# Patient Record
Sex: FEMALE | Race: WHITE | Employment: UNEMPLOYED | URBAN - METROPOLITAN AREA
[De-identification: names, ages, dates, MRNs, and addresses within clinical notes are randomized per-mention and may not be internally consistent; named-entity substitution may affect disease eponyms.]

---

## 2018-07-08 ENCOUNTER — HOSPITAL ENCOUNTER (EMERGENCY)
Facility: HOSPITAL | Age: 28
Discharge: HOME | End: 2018-07-08
Attending: EMERGENCY MEDICINE
Payer: MEDICAID

## 2018-07-08 ENCOUNTER — HOSPITAL ENCOUNTER (OUTPATIENT)
Age: 28
Discharge: ANOTHER HOSPITAL | End: 2018-07-08
Payer: MEDICAID

## 2018-07-08 ENCOUNTER — APPOINTMENT (OUTPATIENT)
Dept: CT IMAGING | Facility: HOSPITAL | Age: 28
End: 2018-07-08
Attending: EMERGENCY MEDICINE
Payer: MEDICAID

## 2018-07-08 ENCOUNTER — APPOINTMENT (OUTPATIENT)
Dept: GENERAL RADIOLOGY | Age: 28
End: 2018-07-08
Attending: PHYSICIAN ASSISTANT
Payer: MEDICAID

## 2018-07-08 VITALS
TEMPERATURE: 98 F | WEIGHT: 239 LBS | SYSTOLIC BLOOD PRESSURE: 111 MMHG | BODY MASS INDEX: 40.8 KG/M2 | DIASTOLIC BLOOD PRESSURE: 64 MMHG | HEIGHT: 64 IN | HEART RATE: 82 BPM | OXYGEN SATURATION: 99 % | RESPIRATION RATE: 19 BRPM

## 2018-07-08 VITALS
OXYGEN SATURATION: 97 % | HEART RATE: 78 BPM | WEIGHT: 239 LBS | BODY MASS INDEX: 40.8 KG/M2 | HEIGHT: 64 IN | RESPIRATION RATE: 16 BRPM | DIASTOLIC BLOOD PRESSURE: 74 MMHG | TEMPERATURE: 99 F | SYSTOLIC BLOOD PRESSURE: 116 MMHG

## 2018-07-08 DIAGNOSIS — R79.89 D-DIMER, ELEVATED: ICD-10-CM

## 2018-07-08 DIAGNOSIS — R42 DIZZINESS: ICD-10-CM

## 2018-07-08 DIAGNOSIS — J40 WHEEZY BRONCHITIS: Primary | ICD-10-CM

## 2018-07-08 DIAGNOSIS — J30.9 ALLERGIC RHINITIS, UNSPECIFIED SEASONALITY, UNSPECIFIED TRIGGER: ICD-10-CM

## 2018-07-08 DIAGNOSIS — J20.9 ACUTE BRONCHITIS, UNSPECIFIED ORGANISM: Primary | ICD-10-CM

## 2018-07-08 LAB
#LYMPHOCYTE IC: 2.4 X10ˆ3/UL (ref 0.9–3.2)
#MXD IC: 0.4 X10ˆ3/UL (ref 0.1–1)
#NEUTROPHIL IC: 3.2 X10ˆ3/UL (ref 1.3–6.7)
ATRIAL RATE: 65 BPM
CREAT SERPL-MCNC: 0.7 MG/DL (ref 0.55–1.02)
DDIMER WHOLE BLOOD: 1520 NG/ML DDU (ref ?–400)
GLUCOSE BLD-MCNC: 107 MG/DL (ref 70–99)
HCT IC: 36.7 % (ref 37–54)
HGB IC: 12.6 G/DL (ref 12–16)
ISTAT BLOOD GAS TCO2: 24 MMOL/L (ref 22–32)
ISTAT BUN: 13 MG/DL (ref 8–20)
ISTAT CHLORIDE: 105 MMOL/L (ref 101–111)
ISTAT HEMATOCRIT: 36 % (ref 34–50)
ISTAT IONIZED CALCIUM: 1.22 MMOL/L (ref 1.12–1.32)
ISTAT POTASSIUM: 3.6 MMOL/L (ref 3.6–5.1)
ISTAT SODIUM: 142 MMOL/L (ref 136–144)
ISTAT TROPONIN: <0.1 NG/ML (ref ?–0.1)
LYMPHOCYTES NFR BLD AUTO: 40.2 %
MCH IC: 27.8 PG (ref 27–33.2)
MCHC IC: 34.3 G/DL (ref 31–37)
MCV IC: 80.8 FL (ref 81–100)
MIXED CELL %: 7.3 %
NEUTROPHILS NFR BLD AUTO: 52.5 %
P AXIS: 61 DEGREES
P-R INTERVAL: 146 MS
PLT IC: 272 X10ˆ3/UL (ref 150–450)
POCT BILIRUBIN URINE: NEGATIVE
POCT BLOOD URINE: NEGATIVE
POCT GLUCOSE URINE: NEGATIVE MG/DL
POCT KETONE URINE: NEGATIVE MG/DL
POCT NITRITE URINE: NEGATIVE
POCT PH URINE: 5.5 (ref 5–8)
POCT PROTEIN URINE: NEGATIVE MG/DL
POCT SPECIFIC GRAVITY URINE: 1.03
POCT URINE CLARITY: CLEAR
POCT URINE COLOR: YELLOW
POCT URINE PREGNANCY: NEGATIVE
POCT UROBILINOGEN URINE: 0.2 MG/DL
Q-T INTERVAL: 420 MS
QRS DURATION: 84 MS
QTC CALCULATION (BEZET): 436 MS
R AXIS: 64 DEGREES
RBC IC: 4.54 X10ˆ6/UL (ref 3.8–5.1)
T AXIS: 43 DEGREES
VENTRICULAR RATE: 65 BPM
WBC IC: 6 X10ˆ3/UL (ref 4–13)

## 2018-07-08 PROCEDURE — 99205 OFFICE O/P NEW HI 60 MIN: CPT

## 2018-07-08 PROCEDURE — 87086 URINE CULTURE/COLONY COUNT: CPT | Performed by: PHYSICIAN ASSISTANT

## 2018-07-08 PROCEDURE — 93010 ELECTROCARDIOGRAM REPORT: CPT

## 2018-07-08 PROCEDURE — 71046 X-RAY EXAM CHEST 2 VIEWS: CPT | Performed by: PHYSICIAN ASSISTANT

## 2018-07-08 PROCEDURE — 84484 ASSAY OF TROPONIN QUANT: CPT

## 2018-07-08 PROCEDURE — 81002 URINALYSIS NONAUTO W/O SCOPE: CPT | Performed by: PHYSICIAN ASSISTANT

## 2018-07-08 PROCEDURE — 99284 EMERGENCY DEPT VISIT MOD MDM: CPT

## 2018-07-08 PROCEDURE — 36415 COLL VENOUS BLD VENIPUNCTURE: CPT

## 2018-07-08 PROCEDURE — 81025 URINE PREGNANCY TEST: CPT | Performed by: PHYSICIAN ASSISTANT

## 2018-07-08 PROCEDURE — 94640 AIRWAY INHALATION TREATMENT: CPT

## 2018-07-08 PROCEDURE — 80047 BASIC METABLC PNL IONIZED CA: CPT

## 2018-07-08 PROCEDURE — 85378 FIBRIN DEGRADE SEMIQUANT: CPT | Performed by: PHYSICIAN ASSISTANT

## 2018-07-08 PROCEDURE — 71275 CT ANGIOGRAPHY CHEST: CPT | Performed by: EMERGENCY MEDICINE

## 2018-07-08 PROCEDURE — 93005 ELECTROCARDIOGRAM TRACING: CPT

## 2018-07-08 PROCEDURE — 85025 COMPLETE CBC W/AUTO DIFF WBC: CPT | Performed by: PHYSICIAN ASSISTANT

## 2018-07-08 RX ORDER — ALBUTEROL SULFATE 90 UG/1
2 AEROSOL, METERED RESPIRATORY (INHALATION) EVERY 4 HOURS PRN
Qty: 1 INHALER | Refills: 0 | Status: SHIPPED | OUTPATIENT
Start: 2018-07-08 | End: 2018-08-07

## 2018-07-08 RX ORDER — PREDNISONE 20 MG/1
40 TABLET ORAL DAILY
Qty: 8 TABLET | Refills: 0 | Status: SHIPPED | OUTPATIENT
Start: 2018-07-08 | End: 2018-07-12

## 2018-07-08 RX ORDER — IPRATROPIUM BROMIDE AND ALBUTEROL SULFATE 2.5; .5 MG/3ML; MG/3ML
3 SOLUTION RESPIRATORY (INHALATION) ONCE
Status: COMPLETED | OUTPATIENT
Start: 2018-07-08 | End: 2018-07-08

## 2018-07-08 RX ORDER — PREDNISONE 20 MG/1
60 TABLET ORAL ONCE
Status: COMPLETED | OUTPATIENT
Start: 2018-07-08 | End: 2018-07-08

## 2018-07-08 NOTE — ED INITIAL ASSESSMENT (HPI)
Pt here for evaluation s/p elevated d-dimer. Pt noes she has a cough for two month. Pt gave birth 2 months ago. Pt notes productive cough and soreness.

## 2018-07-08 NOTE — ED PROVIDER NOTES
Patient Seen in: [facility] Emergency Department    History   Patient presents with:  Abnormal Result (metabolic, cardiac)    Stated Complaint: abn lab    HPI    Patient is a 76-year-old female who states for the past 2 months she has had a cough. muscles intact, pupils equal round reactive to light and accommodation. Mouth normal, neck supple, no meningismus. LUNGS: Lungs clear to auscultation bilaterally. CARDIOVASCULAR: + S1-S2, regular rate and rhythm, no murmurs.   BACK: No CVA tenderness, no

## 2018-07-08 NOTE — ED INITIAL ASSESSMENT (HPI)
Patient c/o cough for 2 months. Has been productive for the last 2 weeks. Intermittent dizziness for 2 days.

## 2018-07-08 NOTE — ED PROVIDER NOTES
Patient Seen in: [id number] [facility]    History   Patient presents with:  Cough  Dizziness (neurologic)    Stated Complaint: cough,dizziness    HPI    26-year-old female here with complaint of a cough ×2 months with productive sputum for appr Alcohol use: No                Review of Systems    Positive for stated complaint: cough,dizziness  Other systems are as noted in HPI. Constitutional and vital signs reviewed.       All other sys following:     HCT IC 36.7 (*)     MCV IC 80.8 (*)     All other components within normal limits   D-DIMER (POC) - Abnormal; Notable for the following:     D-Dimer DDU 1,520 (*)     All other components within normal limits   POCT ISTAT CHEM8 CARTRIDGE - A please see the attestation. The patient is in good condition thru out treatment today and remains so upon discharge. I discussed the plan of care with the patient, who expresses understanding.  All questions and concerns are addressed to the patients sa

## 2019-03-21 PROBLEM — O34.211 MATERNAL CARE DUE TO LOW TRANSVERSE UTERINE SCAR FROM PREVIOUS CESAREAN DELIVERY: Status: ACTIVE | Noted: 2019-03-21

## 2019-03-21 PROBLEM — Z36.82 NUCHAL TRANSLUCENCY OF FETUS ON PRENATAL ULTRASOUND: Status: ACTIVE | Noted: 2019-03-21

## 2019-03-21 PROBLEM — O99.212 OBESITY AFFECTING PREGNANCY IN SECOND TRIMESTER: Status: ACTIVE | Noted: 2019-03-21

## 2019-03-21 PROBLEM — Z83.2 FAMILY HISTORY OF FACTOR V LEIDEN MUTATION: Status: ACTIVE | Noted: 2019-03-21

## 2019-03-21 PROBLEM — O09.41 GRAND MULTIPARITY WITH CURRENT PREGNANCY IN FIRST TRIMESTER: Status: ACTIVE | Noted: 2019-03-21

## 2019-05-13 PROBLEM — O09.92 HIGH-RISK PREGNANCY IN SECOND TRIMESTER: Status: ACTIVE | Noted: 2019-03-21

## 2019-05-14 PROBLEM — O09.42 GRAND MULTIPARITY WITH CURRENT PREGNANCY IN SECOND TRIMESTER: Status: ACTIVE | Noted: 2019-03-21

## 2019-07-25 PROBLEM — O09.40 GRAND MULTIPARITY WITH CURRENT PREGNANCY, ANTEPARTUM: Status: ACTIVE | Noted: 2019-03-21

## 2019-07-25 PROBLEM — O09.90 HIGH RISK PREGNANCY, ANTEPARTUM: Status: ACTIVE | Noted: 2019-03-21

## 2019-07-25 PROBLEM — O24.410 DIET CONTROLLED GESTATIONAL DIABETES MELLITUS (GDM) IN THIRD TRIMESTER: Status: ACTIVE | Noted: 2019-07-25

## 2019-07-29 ENCOUNTER — HOSPITAL ENCOUNTER (OUTPATIENT)
Dept: DIABETES SERVICES | Age: 29
Discharge: HOME | End: 2019-07-29
Payer: COMMERCIAL

## 2019-07-29 VITALS — WEIGHT: 265 LBS | BODY MASS INDEX: 45.24 KG/M2 | HEIGHT: 64 IN

## 2019-07-29 PROCEDURE — G0109 DIAB MANAGE TRN IND/GROUP: HCPCS

## 2019-07-29 NOTE — PROGRESS NOTES
Gestational Diabetes Self-Management Support Plan    Services Provided: Pt received education on nutrition and meal planning during pregnancy. Emotional support for adjustment to diagnosis was provided. Question pt's comprehension due to bringing her 5 children to class and needing to tend to the children for much of the class. Care Plan/Recommendations:  Pt instructed to record blood sugar 4x/day and record all meals and snacks. Pt to bring information to appointments with [facility]. Notes for Follow Up:   1. Barriers to checking blood glucose and adherence to meal plan identified: none2. Barriers to psychological adjustment to diagnosis identified: none  3. Patient needs to be seen by [facility] ASAP due to: has appointment 7/301/9.     Jewels Garcia, [address], LD, CDE  [facility] [address] [facility] [place]

## 2019-07-30 PROBLEM — O99.013 ANEMIA AFFECTING PREGNANCY IN THIRD TRIMESTER: Status: ACTIVE | Noted: 2019-07-30

## 2019-07-30 PROBLEM — O99.213 OBESITY AFFECTING PREGNANCY IN THIRD TRIMESTER: Status: ACTIVE | Noted: 2019-03-21

## 2019-08-20 PROBLEM — O09.93 HIGH-RISK PREGNANCY IN THIRD TRIMESTER: Status: ACTIVE | Noted: 2019-03-21

## 2019-08-20 PROBLEM — R06.2 WHEEZING: Status: ACTIVE | Noted: 2019-08-20

## 2019-08-21 ENCOUNTER — HOSPITAL ENCOUNTER (OUTPATIENT)
Dept: GENERAL RADIOLOGY | Age: 29
Discharge: HOME | End: 2019-08-21
Payer: COMMERCIAL

## 2019-08-21 PROCEDURE — 71046 X-RAY EXAM CHEST 2 VIEWS: CPT

## 2019-09-12 PROBLEM — O24.415 ORAL HYPOGLYCEMIC CONTROLLED WHITE CLASSIFICATION A2 GESTATIONAL DIABETES MELLITUS (GDM): Status: ACTIVE | Noted: 2019-07-25

## 2019-09-17 PROBLEM — B95.1 POSITIVE GBS TEST: Status: ACTIVE | Noted: 2019-09-17

## 2019-09-24 PROBLEM — Z83.2 FAMILY HISTORY OF FACTOR V LEIDEN MUTATION: Status: RESOLVED | Noted: 2019-03-21 | Resolved: 2019-09-24

## 2019-09-27 ENCOUNTER — HOSPITAL ENCOUNTER (INPATIENT)
Age: 29
LOS: 2 days | Discharge: HOME | DRG: 560 | End: 2019-09-29
Attending: OBSTETRICS & GYNECOLOGY | Admitting: OBSTETRICS & GYNECOLOGY
Payer: COMMERCIAL

## 2019-09-27 ENCOUNTER — ANESTHESIA (OUTPATIENT)
Dept: LABOR AND DELIVERY | Age: 29
DRG: 560 | End: 2019-09-27
Payer: COMMERCIAL

## 2019-09-27 ENCOUNTER — ANESTHESIA EVENT (OUTPATIENT)
Dept: LABOR AND DELIVERY | Age: 29
DRG: 560 | End: 2019-09-27
Payer: COMMERCIAL

## 2019-09-27 PROBLEM — Z34.90 ENCOUNTER FOR INDUCTION OF LABOR: Status: ACTIVE | Noted: 2019-09-27

## 2019-09-27 PROBLEM — Z3A.39 39 WEEKS GESTATION OF PREGNANCY: Status: ACTIVE | Noted: 2019-09-27

## 2019-09-27 LAB
ABO + RH BLD: NORMAL
BLOOD GROUP ANTIBODIES SERPL: NORMAL
ERYTHROCYTE [DISTWIDTH] IN BLOOD BY AUTOMATED COUNT: 20.3 % (ref 11.9–14.6)
GLUCOSE BLD STRIP.AUTO-MCNC: 63 MG/DL (ref 65–100)
GLUCOSE BLD STRIP.AUTO-MCNC: 79 MG/DL (ref 65–100)
HCT VFR BLD AUTO: 32.7 % (ref 35.8–46.3)
HGB BLD-MCNC: 9.9 G/DL (ref 11.7–15.4)
MCH RBC QN AUTO: 23.9 PG (ref 26.1–32.9)
MCHC RBC AUTO-ENTMCNC: 30.3 G/DL (ref 31.4–35)
MCV RBC AUTO: 79 FL (ref 79.6–97.8)
NRBC # BLD: 0 K/UL (ref 0–0.2)
PLATELET # BLD AUTO: 230 K/UL (ref 150–450)
PMV BLD AUTO: 12.1 FL (ref 9.4–12.3)
RBC # BLD AUTO: 4.14 M/UL (ref 4.05–5.2)
SPECIMEN EXP DATE BLD: NORMAL
WBC # BLD AUTO: 5.7 K/UL (ref 4.3–11.1)

## 2019-09-27 PROCEDURE — 74011250636 HC RX REV CODE- 250/636: Performed by: OBSTETRICS & GYNECOLOGY

## 2019-09-27 PROCEDURE — 76060000078 HC EPIDURAL ANESTHESIA: Performed by: OBSTETRICS & GYNECOLOGY

## 2019-09-27 PROCEDURE — 3E033VJ INTRODUCTION OF OTHER HORMONE INTO PERIPHERAL VEIN, PERCUTANEOUS APPROACH: ICD-10-PCS | Performed by: OBSTETRICS & GYNECOLOGY

## 2019-09-27 PROCEDURE — 10907ZC DRAINAGE OF AMNIOTIC FLUID, THERAPEUTIC FROM PRODUCTS OF CONCEPTION, VIA NATURAL OR ARTIFICIAL OPENING: ICD-10-PCS | Performed by: OBSTETRICS & GYNECOLOGY

## 2019-09-27 PROCEDURE — 82962 GLUCOSE BLOOD TEST: CPT

## 2019-09-27 PROCEDURE — 77030011943

## 2019-09-27 PROCEDURE — 74011000250 HC RX REV CODE- 250

## 2019-09-27 PROCEDURE — 75410000003 HC RECOV DEL/VAG/CSECN EA 0.5 HR: Performed by: OBSTETRICS & GYNECOLOGY

## 2019-09-27 PROCEDURE — 74011250636 HC RX REV CODE- 250/636: Performed by: ANESTHESIOLOGY

## 2019-09-27 PROCEDURE — 74011000258 HC RX REV CODE- 258: Performed by: OBSTETRICS & GYNECOLOGY

## 2019-09-27 PROCEDURE — 88307 TISSUE EXAM BY PATHOLOGIST: CPT

## 2019-09-27 PROCEDURE — 85027 COMPLETE CBC AUTOMATED: CPT

## 2019-09-27 PROCEDURE — 75410000000 HC DELIVERY VAGINAL/SINGLE: Performed by: OBSTETRICS & GYNECOLOGY

## 2019-09-27 PROCEDURE — 86900 BLOOD TYPING SEROLOGIC ABO: CPT

## 2019-09-27 PROCEDURE — 74011250637 HC RX REV CODE- 250/637: Performed by: OBSTETRICS & GYNECOLOGY

## 2019-09-27 PROCEDURE — 4A1HXCZ MONITORING OF PRODUCTS OF CONCEPTION, CARDIAC RATE, EXTERNAL APPROACH: ICD-10-PCS | Performed by: OBSTETRICS & GYNECOLOGY

## 2019-09-27 PROCEDURE — 74011250636 HC RX REV CODE- 250/636

## 2019-09-27 PROCEDURE — 65270000029 HC RM PRIVATE

## 2019-09-27 PROCEDURE — 75410000002 HC LABOR FEE PER 1 HR: Performed by: OBSTETRICS & GYNECOLOGY

## 2019-09-27 RX ORDER — DEXTROSE, SODIUM CHLORIDE, SODIUM LACTATE, POTASSIUM CHLORIDE, AND CALCIUM CHLORIDE 5; .6; .31; .03; .02 G/100ML; G/100ML; G/100ML; G/100ML; G/100ML
125 INJECTION, SOLUTION INTRAVENOUS CONTINUOUS
Status: DISCONTINUED | OUTPATIENT
Start: 2019-09-27 | End: 2019-09-27

## 2019-09-27 RX ORDER — OXYTOCIN/RINGER'S LACTATE 30/500 ML
0-25 PLASTIC BAG, INJECTION (ML) INTRAVENOUS
Status: DISCONTINUED | OUTPATIENT
Start: 2019-09-27 | End: 2019-09-27

## 2019-09-27 RX ORDER — FAMOTIDINE 20 MG/1
20 TABLET, FILM COATED ORAL ONCE
Status: DISCONTINUED | OUTPATIENT
Start: 2019-09-27 | End: 2019-09-27

## 2019-09-27 RX ORDER — DOCUSATE SODIUM 100 MG/1
100 CAPSULE, LIQUID FILLED ORAL 2 TIMES DAILY
Status: DISCONTINUED | OUTPATIENT
Start: 2019-09-27 | End: 2019-09-29 | Stop reason: HOSPADM

## 2019-09-27 RX ORDER — DIPHENHYDRAMINE HCL 25 MG
25 CAPSULE ORAL
Status: DISCONTINUED | OUTPATIENT
Start: 2019-09-27 | End: 2019-09-29 | Stop reason: HOSPADM

## 2019-09-27 RX ORDER — OXYCODONE AND ACETAMINOPHEN 7.5; 325 MG/1; MG/1
2 TABLET ORAL
Status: DISCONTINUED | OUTPATIENT
Start: 2019-09-27 | End: 2019-09-29 | Stop reason: HOSPADM

## 2019-09-27 RX ORDER — LIDOCAINE HYDROCHLORIDE AND EPINEPHRINE 15; 5 MG/ML; UG/ML
INJECTION, SOLUTION EPIDURAL
Status: COMPLETED | OUTPATIENT
Start: 2019-09-27 | End: 2019-09-27

## 2019-09-27 RX ORDER — ZOLPIDEM TARTRATE 5 MG/1
5 TABLET ORAL
Status: DISCONTINUED | OUTPATIENT
Start: 2019-09-27 | End: 2019-09-29 | Stop reason: HOSPADM

## 2019-09-27 RX ORDER — SODIUM CHLORIDE 0.9 % (FLUSH) 0.9 %
5-40 SYRINGE (ML) INJECTION EVERY 8 HOURS
Status: DISCONTINUED | OUTPATIENT
Start: 2019-09-27 | End: 2019-09-27

## 2019-09-27 RX ORDER — OXYTOCIN/RINGER'S LACTATE 15/250 ML
250 PLASTIC BAG, INJECTION (ML) INTRAVENOUS ONCE
Status: DISCONTINUED | OUTPATIENT
Start: 2019-09-27 | End: 2019-09-27

## 2019-09-27 RX ORDER — NALOXONE HYDROCHLORIDE 0.4 MG/ML
0.4 INJECTION, SOLUTION INTRAMUSCULAR; INTRAVENOUS; SUBCUTANEOUS AS NEEDED
Status: DISCONTINUED | OUTPATIENT
Start: 2019-09-27 | End: 2019-09-29 | Stop reason: HOSPADM

## 2019-09-27 RX ORDER — SODIUM CHLORIDE 0.9 % (FLUSH) 0.9 %
5-40 SYRINGE (ML) INJECTION AS NEEDED
Status: DISCONTINUED | OUTPATIENT
Start: 2019-09-27 | End: 2019-09-27

## 2019-09-27 RX ORDER — LIDOCAINE HYDROCHLORIDE 10 MG/ML
1 INJECTION INFILTRATION; PERINEURAL
Status: DISCONTINUED | OUTPATIENT
Start: 2019-09-27 | End: 2019-09-27

## 2019-09-27 RX ORDER — ONDANSETRON 8 MG/1
8 TABLET, ORALLY DISINTEGRATING ORAL
Status: DISCONTINUED | OUTPATIENT
Start: 2019-09-27 | End: 2019-09-29 | Stop reason: HOSPADM

## 2019-09-27 RX ORDER — HYDROMORPHONE HYDROCHLORIDE 1 MG/ML
1 INJECTION, SOLUTION INTRAMUSCULAR; INTRAVENOUS; SUBCUTANEOUS
Status: DISCONTINUED | OUTPATIENT
Start: 2019-09-27 | End: 2019-09-29 | Stop reason: HOSPADM

## 2019-09-27 RX ORDER — MINERAL OIL
120 OIL (ML) ORAL
Status: COMPLETED | OUTPATIENT
Start: 2019-09-27 | End: 2019-09-27

## 2019-09-27 RX ORDER — LIDOCAINE HYDROCHLORIDE 20 MG/ML
JELLY TOPICAL
Status: DISCONTINUED | OUTPATIENT
Start: 2019-09-27 | End: 2019-09-27

## 2019-09-27 RX ORDER — IBUPROFEN 800 MG/1
800 TABLET ORAL EVERY 8 HOURS
Status: DISCONTINUED | OUTPATIENT
Start: 2019-09-27 | End: 2019-09-29 | Stop reason: HOSPADM

## 2019-09-27 RX ORDER — ROPIVACAINE HYDROCHLORIDE 2 MG/ML
INJECTION, SOLUTION EPIDURAL; INFILTRATION; PERINEURAL
Status: DISCONTINUED | OUTPATIENT
Start: 2019-09-27 | End: 2019-09-27 | Stop reason: HOSPADM

## 2019-09-27 RX ORDER — BUTORPHANOL TARTRATE 2 MG/ML
1 INJECTION INTRAMUSCULAR; INTRAVENOUS
Status: DISCONTINUED | OUTPATIENT
Start: 2019-09-27 | End: 2019-09-27

## 2019-09-27 RX ORDER — ROPIVACAINE HYDROCHLORIDE 2 MG/ML
INJECTION, SOLUTION EPIDURAL; INFILTRATION; PERINEURAL AS NEEDED
Status: DISCONTINUED | OUTPATIENT
Start: 2019-09-27 | End: 2019-09-27 | Stop reason: HOSPADM

## 2019-09-27 RX ADMIN — ROPIVACAINE HYDROCHLORIDE 4 ML: 2 INJECTION, SOLUTION EPIDURAL; INFILTRATION; PERINEURAL at 11:28

## 2019-09-27 RX ADMIN — DOCUSATE SODIUM 100 MG: 100 CAPSULE ORAL at 17:37

## 2019-09-27 RX ADMIN — SODIUM CHLORIDE 5 MILLION UNITS: 900 INJECTION, SOLUTION INTRAVENOUS at 08:15

## 2019-09-27 RX ADMIN — SODIUM CHLORIDE, SODIUM LACTATE, POTASSIUM CHLORIDE, AND CALCIUM CHLORIDE 1000 ML: 600; 310; 30; 20 INJECTION, SOLUTION INTRAVENOUS at 11:46

## 2019-09-27 RX ADMIN — LIDOCAINE HYDROCHLORIDE AND EPINEPHRINE 3.5 ML: 15; 5 INJECTION, SOLUTION EPIDURAL at 11:22

## 2019-09-27 RX ADMIN — MINERAL OIL 120 ML: 471.95 OIL ORAL at 14:16

## 2019-09-27 RX ADMIN — ROPIVACAINE HYDROCHLORIDE 4 ML: 2 INJECTION, SOLUTION EPIDURAL; INFILTRATION; PERINEURAL at 11:26

## 2019-09-27 RX ADMIN — PENICILLIN G POTASSIUM 2.5 MILLION UNITS: 20000000 POWDER, FOR SOLUTION INTRAVENOUS at 11:45

## 2019-09-27 RX ADMIN — SODIUM CHLORIDE, SODIUM LACTATE, POTASSIUM CHLORIDE, CALCIUM CHLORIDE, AND DEXTROSE MONOHYDRATE 125 ML/HR: 600; 310; 30; 20; 5 INJECTION, SOLUTION INTRAVENOUS at 08:15

## 2019-09-27 RX ADMIN — IBUPROFEN 800 MG: 800 TABLET, FILM COATED ORAL at 17:37

## 2019-09-27 RX ADMIN — OXYTOCIN 2 MILLI-UNITS/MIN: 10 INJECTION, SOLUTION INTRAMUSCULAR; INTRAVENOUS at 08:30

## 2019-09-27 RX ADMIN — ROPIVACAINE HYDROCHLORIDE 8 ML/HR: 2 INJECTION, SOLUTION EPIDURAL; INFILTRATION; PERINEURAL at 11:28

## 2019-09-27 NOTE — ANESTHESIA POSTPROCEDURE EVALUATION
PASCALE for labor and vaginal delivery. epidural    Anesthesia Post Evaluation      Multimodal analgesia: multimodal analgesia used between 6 hours prior to anesthesia start to PACU discharge  Patient location during evaluation: bedside  Patient participation: complete - patient participated  Level of consciousness: awake  Pain management: adequate  Airway patency: patent  Anesthetic complications: no  Cardiovascular status: acceptable and hemodynamically stable  Respiratory status: acceptable  Hydration status: acceptable  Comments: No residual numbness or weakness. Pt satisfied with labor analgesia. Post anesthesia nausea and vomiting:  none      No vitals data found for the desired time range.

## 2019-09-27 NOTE — PROGRESS NOTES
Dr. Sangita Chicas at bedside; portion of strip reviewed. SVE per MD 3/40/-2. Attempted to AROM; unsure. No fluid seen. May have epidural when desires.    IVB started for epidural at pt's request  Anesthesia notified

## 2019-09-27 NOTE — PROGRESS NOTES
Early decels, difficult to trace due to maternal position. Frequent contractions, palpates moderate, little resting tone in between.   IVB started and Pitocin decreased to 6 mu

## 2019-09-27 NOTE — PROGRESS NOTES
Admission assessment complete as noted. Patient oriented to room and unit. Plan of care reviewed and patient verbalizes understanding. Questions encouraged and answered. Patent encouraged to call for needs or concerns. Patient up to bathroom with RN assistance. Rocio-care taught and completed. Questions encouraged and answered. Patient back to bed, encouraged to call for needs or concerns. Verbalizes understanding.

## 2019-09-27 NOTE — PROGRESS NOTES
SBAR OUT Report: Mother    Verbal report given to Sourav Chaudhry RN on this patient, who is now being transferred to MIU for routine progression of care. The patient is not wearing a green \"Anesthesia-Duramorph\" band. Report consisted of patient's Situation, Background, Assessment and Recommendations (SBAR). [place] ID bands were compared with the identification form, and verified with the patient and receiving nurse. Information from the SBAR and the [address] Report was reviewed with the receiving nurse; opportunity for questions and clarification provided.

## 2019-09-27 NOTE — PROGRESS NOTES
1345:  Pt removed from peanut, SVE c/c/+1-+2; Dr. Prince Way called, notified of exam and decels. Orders to begin pushing, MD on floor. 1349:  Pt c/o increase vaginal pressure. Spontaneous fetal descent to crown.   Dr. Prince Way called to bedside for delivery  [id number]:  MD at bedside  [phone]:   viable female, apgars 9/9 wt 6-13  1405:  Delivery of placenta; pitocin started

## 2019-09-27 NOTE — L&D DELIVERY NOTE
Delivery Summary    Patient: Caroline Rios MRN: [id number]  SSN: [id number]    YOB: 1990  Age: 26 y.o. Sex: female       Information for the patient's :  Iraj Arreola [[id number]]       Labor Events:    Labor: No    Steroids:     Cervical Ripening Date/Time:       Cervical Ripening Type: None   Antibiotics During Labor: Yes   Rupture Identifier: Sac 1    Rupture Date/Time: 2019     Rupture Type: AROM   Amniotic Fluid Volume: Moderate    Amniotic Fluid Description: Clear    Amniotic Fluid Odor: None    Induction: Oxytocin       Induction Date/Time: 2019 8:15 AM    Indications for Induction: Other(comment)    Augmentation: AROM   Augmentation Date/Time:      Indications for Augmentation: Ineffective Contraction Pattern   Labor complications: Abruptio Placenta       Additional complications:        Delivery Events:  Indications For Episiotomy:     Episiotomy: None   Perineal Laceration(s): None   Repaired:     Periurethral Laceration Location:      Repaired:     Labial Laceration Location:     Repaired:     Sulcal Laceration Location:     Repaired:     Vaginal Laceration Location:     Repaired:     Cervical Laceration Location:     Repaired:     Repair Suture: None   Number of Repair Packets:     Estimated Blood Loss (ml): 500ml     Delivery Date: 2019    Delivery Time: 1:57 PM  Delivery Type: Vaginal, Spontaneous  Sex:  Female    Gestational Age: 39w0d   Delivery Clinician:  Anastacia Johnson  Living Status: Living   Delivery Location: L&D 430          APGARS  One minute Five minutes Ten minutes   Skin color: 1   1        Heart rate: 2   2        Grimace: 2   2        Muscle tone: 2   2        Breathin   2        Totals: 9   9            Presentation: Vertex    Position: Left Occiput Anterior  Resuscitation Method:  Suctioning-bulb; Tactile Stimulation     Meconium Stained: None      Cord Information: 3 Vessels  Complications: None  Cord around: Delayed cord clamping? Yes  Cord clamped date/time:   Disposition of Cord Blood: Lab    Blood Gases Sent?: No    Placenta:  Date/Time: 2019  2:05 PM  Removal: Spontaneous      Appearance: Abnormal      Measurements:  Birth Weight: 6 lb 13 oz (3.09 kg)      Birth Length: 49.5 cm      Head Circumference: 35 cm      Chest Circumference: 33 cm     Abdominal Girth: Other Providers:   Antonio HARDWICK JOCELYN R;MARINO, SHANNON Dub Brush, Obstetrician;Primary Nurse;Primary [place] Nurse;Charge Nurse           Group B Strep: No results found for: GRBSEXT, GRBSEXT  Information for the patient's :  Dillon Pert [[id number]]   No results found for: ABORH, PCTABR, PCTDIG, BILI, ABORHEXT, ABORH    No results for input(s): PCO2CB, PO2CB, HCO3I, SO2I, IBD, PTEMPI, SPECTI, PHICB, ISITE, IDEV, IALLEN in the last 72 hours. Pt rapidly progressed to c/c/+2. Baby was  w/out pushing on my arrival. Body quickly followed delivery of head. Large amt bloody fluid at that time as well. Pt had repetitive severe variable decels about the last 20min. Baby girl vigorous and good cry on perineum. Delayed cord clamping. Placenta spontaneous. 15% abruption noted. Placenta to path.

## 2019-09-27 NOTE — H&P
History & Physical    Name: Wili Hoyos MRN: [id number]  SSN: [id number]    YOB: 1990  Age: 26 y.o. Sex: female      Subjective:     Estimated Date of Delivery: 10/4/19  OB History    Para Term  AB Living   6 5 5     5   SAB TAB Ectopic Molar Multiple Live Births             5      # Outcome Date GA Lbr Germán/2nd Weight Sex Delivery Anes PTL Lv   6 Current            5 Term 18 41w0d  8 lb 13 oz (3.997 kg) M VACD EPI  ANIVAL      Birth Comments: induced   4 Term 16 41w0d  8 lb (3.629 kg) F VACD EPI  ANIVAL      Birth Comments: induced   3 Term 08/15/14 40w0d  7 lb 13 oz (3.544 kg) M VACD EPI N ANIVAL      Birth Comments: epidural did not work   2 Term 13 39w0d  7 lb 10 oz (3.459 kg) M VACD EPI N ANIVAL   1 Term 11 41w0d  7 lb (3.175 kg) M CS-Unspec Spinal N ANIVAL      Birth Comments: fetal distress       Ms. Lissette Rendon is admitted with pregnancy at 39w0d for induction of labor due to favorable cervix at term and gestational diabetes. Prenatal course was complicated by GDM (on metformin), anemia (was instructed to take Fe), obesity, hx c/s (with subsequent successful vag delivery proven to 8#13). Please see prenatal records for details. ewf on 19 3200.     Past Medical History:   Diagnosis Date    Anemia     in pregnancy    Diabetes ([address]      Past Surgical History:   Procedure Laterality Date    APPENDECTOMY,W OTHR C      HX  SECTION      x1    HX TONSILLECTOMY       Social History     Occupational History    Not on file   Tobacco Use    Smoking status: Never Smoker    Smokeless tobacco: Never Used   Substance and Sexual Activity    Alcohol use: No     Frequency: Never    Drug use: No    Sexual activity: Yes     Partners: Male     Birth control/protection: None     Family History   Problem Relation Age of Onset    Heart Attack Mother 28    Stroke Mother     Other Mother         factor V + with PFO, tobacco use    Diabetes Neg Hx        No Known Allergies  Prior to Admission medications    Medication Sig Start Date End Date Taking? Authorizing Provider   metFORMIN (GLUCOPHAGE) 1,000 mg tablet Take 1 Tab by mouth nightly. 9/13/19  Yes Laurie Garrison MD   ferrous sulfate (IRON) 325 mg (65 mg iron) tablet Take 1 Tab by mouth two (2) times a day. 9/12/19  Yes Laurie Garrison MD   glucose blood VI test strips (ASCENSIA AUTODISC VI, ONE TOUCH ULTRA TEST VI) strip Use as dir QID. Any generic patient's insurance will cover is okay 8/21/19  Yes Leesa Henriquez MD   lancets misc Check your blood sugar 4x daily. Fasting in the morning then 1 hour after each meal 7/24/19  Yes Leesa Henriquez MD   Blood-Glucose Meter monitoring kit Check your blood sugar 4x daily. Fasting in the morning then 1 hour after each meal 7/24/19  Yes Leesa Henriquez MD   acetaminophen (TYLENOL EXTRA STRENGTH) 500 mg tablet Take  by mouth every six (6) hours as needed for Pain. Yes Provider, Historical   ascorbic acid, vitamin C, (VITAMIN C) 500 mg tablet Take 1 Tab by mouth two (2) times a day. 9/12/19   Keisha Lloyd MD   albuterol (PROVENTIL HFA, VENTOLIN HFA, PROAIR HFA) 90 mcg/actuation inhaler Take 2 Puffs by inhalation every four (4) hours as needed for Wheezing. 8/7/19   Leesa Henriquez MD   cholecalciferol, vitamin D3, (VITAMIN D3) 2,000 unit tab Take  by mouth. Provider, Historical   PNV#16-Iron Fum & PS-FA-OM-3 35-1-200 mg cap Take  by mouth. PNV gummy    Provider, Historical        Review of Systems: Pertinent items are noted in the History of Present Illness. Objective:     Vitals:  Vitals:    09/27/19 0830 09/27/19 0845 09/27/19 0915 09/27/19 0930   BP: 119/69 112/69 127/83 116/67   Pulse: 68 77 65 63   Resp:       Temp:            Physical Exam:  Patient without distress.   Heart: Regular rate and rhythm  Lung: clear to auscultation throughout lung fields, no wheezes, no rales, no rhonchi and normal respiratory effort  Membranes: Intact  Fetal Heart Rate: Reactive          Prenatal Labs:   Lab Results   Component Value Date/Time    ABO/Rh(D) A POSITIVE 09/27/2019 07:58 AM    Gonorrhea, External Negative 03/18/2019    Chlamydia, External Negtaive 03/18/2019       Impression/Plan:     Active Problems:    Positive GBS test (9/17/2019)      39 weeks gestation of pregnancy (9/27/2019)      Encounter for induction of labor (9/27/2019)         Plan: Admit for induction of labor. Group B Strep positive, will treat prophylactically with penicillin.

## 2019-09-27 NOTE — ANESTHESIA PROCEDURE NOTES
Epidural Block    Start time: 9/27/2019 11:18 AM  End time: 9/27/2019 11:24 AM  Performed by: Malina Lagunas MD  Authorized by: Malina Lagunas MD     Pre-Procedure  Indication: labor epidural    Preanesthetic Checklist: patient identified, risks and benefits discussed, anesthesia consent, site marked, patient being monitored, timeout performed and anesthesia consent    Timeout Time: 11:17        Epidural:   Patient position:  Seated  Prep region:  Lumbar  Prep: Patient draped and Chlorhexidine    Location:  L4-5    Needle and Epidural Catheter:   Needle Type:  Tuohy  Needle Gauge:  19 G  Injection Technique:  Loss of resistance using saline  Attempts:  1  Catheter Size:  19 G  Catheter at Skin Depth (cm):  12  Depth in Epidural Space (cm):  6  Events: no blood with aspiration, no cerebrospinal fluid with aspiration, no paresthesia and negative aspiration test    Test Dose:  Lidocaine 1.5% w/ epi and negative    Assessment:   Catheter Secured:  Tegaderm and tape  Insertion:  Uncomplicated  Patient tolerance:  Patient tolerated the procedure well with no immediate complications

## 2019-09-27 NOTE — PROGRESS NOTES
Report received from [facility]. Pt care assumed. Assessment as documented.

## 2019-09-27 NOTE — PROGRESS NOTES
Pt admitted to 430 for scheduled IOL. Plan of care reviewed with pt. Verbalizes understanding. Consents signed. IV started by CRISTIAN Tyler RN. Blood drawn and sent to lab. CAROLYN 3/50/-2. Pitocin started at 2 mu as ordered.

## 2019-09-27 NOTE — ROUTINE PROCESS
SBAR IN Report: Mother Verbal report received from Tanna Wade RN (full name & credentials) on this patient, who is now being transferred from [facility] (unit) for routine progression of care. The patient is not wearing a green \"Anesthesia-Duramorph\" band. Report consisted of patient's Situation, Background, Assessment and Recommendations (SBAR).  ID bands were compared with the identification form, and verified with the patient and transferring nurse. Information from the SBAR and the [facility] Report was reviewed with the transferring nurse; opportunity for questions and clarification provided.

## 2019-09-27 NOTE — PROGRESS NOTES
Pt feeling some ctx's    Visit Vitals  /80   Pulse 72   Temp 98 °F (36.7 °C)   Resp 20   LMP 12/21/2018 (Approximate)   Breastfeeding? Yes     bs 79  hgb low at 9.4  Strip reactive, no decels, some short areas where it doesn't trace well  cx 3/40/-2  Attempted arom. No fluid seen. D/w pt and  the risk for uterine rupture and shoulder dystocia. This is her 1st preg with GDM. They say her 8#13 baby \"popped right out\".

## 2019-09-27 NOTE — ANESTHESIA PREPROCEDURE EVALUATION
Relevant Problems   No relevant active problems       Anesthetic History   No history of anesthetic complications            Review of Systems / Medical History  Patient summary reviewed and pertinent labs reviewed    Pulmonary  Within defined limits                 Neuro/Psych   Within defined limits           Cardiovascular  Within defined limits                Exercise tolerance: >4 METS     GI/Hepatic/Renal  Within defined limits              Endo/Other    Diabetes (GDM): well controlled    Morbid obesity     Other Findings   Comments:  -  with 3 subsequent vaginal deliveries           Physical Exam    Airway  Mallampati: III  TM Distance: 4 - 6 cm  Neck ROM: normal range of motion   Mouth opening: Normal     Cardiovascular               Dental  No notable dental hx       Pulmonary                 Abdominal         Other Findings            Anesthetic Plan    ASA: 3  Anesthesia type: epidural            Anesthetic plan and risks discussed with: Patient

## 2019-09-28 PROCEDURE — 65270000029 HC RM PRIVATE

## 2019-09-28 PROCEDURE — 74011250637 HC RX REV CODE- 250/637: Performed by: OBSTETRICS & GYNECOLOGY

## 2019-09-28 RX ADMIN — IBUPROFEN 800 MG: 800 TABLET, FILM COATED ORAL at 01:27

## 2019-09-28 RX ADMIN — IBUPROFEN 800 MG: 800 TABLET, FILM COATED ORAL at 18:11

## 2019-09-28 RX ADMIN — DOCUSATE SODIUM 100 MG: 100 CAPSULE ORAL at 18:11

## 2019-09-28 RX ADMIN — DOCUSATE SODIUM 100 MG: 100 CAPSULE ORAL at 10:05

## 2019-09-28 RX ADMIN — IBUPROFEN 800 MG: 800 TABLET, FILM COATED ORAL at 10:05

## 2019-09-28 NOTE — PROGRESS NOTES
Post-Partum Day Number 1 Progress Note  Talita Wagner  [id number]    Patient doing well post-partum without significant complaint. Voiding without difficulty, normal lochia. Vitals:    Patient Vitals for the past 8 hrs:   BP Temp Pulse Resp SpO2   19 0717 106/70 98 °F (36.7 °C) 74 16 97 %     Temp (24hrs), Av.2 °F (36.8 °C), Min:98 °F (36.7 °C), Max:98.4 °F (36.9 °C)      Vital signs stable, afebrile. Exam:  Patient without distress. Abdomen soft, fundus firm at level of umbilicus, nontender               Labs: No results found for this or any previous visit (from the past 24 hour(s)). Assessment and Plan:  Patient appears to be having uncomplicated post-partum course. Continue routine perineal care and maternal education. Plan discharge tomorrow if no problems occur.

## 2019-09-28 NOTE — LACTATION NOTE

## 2019-09-28 NOTE — LACTATION NOTE
This note was copied from a baby's chart. In to see mom and infant for first time. Mom's 6th baby. She breast fed all her children for 1 yr. Good supply. Mom states so far this baby doing okay, but has had some on and off choppiness at breast w/ sucking. Did feeding observation w/ mom per request. Baby does latch, but usually tries to tuck in upper lip. Makes mom sore w/ rubbing. Showed her how to do deeper breast compression and quick latching to try to get baby more breast tissue in mouth and under top lip. Flange as needed. Baby did on and off both breast for 18 minutes, did have a couple of good stretches where baby actively fed smoothly and audible swallowing noted. Baby stopped trying after about 18 minutes. Reviewed 2nd 24 hr feeding/output expectations, cluster feeding. Encouraged her to ask for hospital grade pump to use at bedside if she becomes too sore to direct breast feed or baby not doing consistently good feedings at breast moving forward this evening. Mom verbalized understanding.  Will follow up in am.

## 2019-09-29 VITALS
TEMPERATURE: 98.3 F | BODY MASS INDEX: 44.56 KG/M2 | OXYGEN SATURATION: 99 % | DIASTOLIC BLOOD PRESSURE: 69 MMHG | SYSTOLIC BLOOD PRESSURE: 114 MMHG | HEART RATE: 61 BPM | WEIGHT: 261 LBS | RESPIRATION RATE: 18 BRPM | HEIGHT: 64 IN

## 2019-09-29 PROCEDURE — 74011250637 HC RX REV CODE- 250/637: Performed by: OBSTETRICS & GYNECOLOGY

## 2019-09-29 RX ORDER — IBUPROFEN 800 MG/1
800 TABLET ORAL EVERY 8 HOURS
Qty: 90 TAB | Refills: 0 | Status: SHIPPED | OUTPATIENT
Start: 2019-09-29 | End: 2019-11-14

## 2019-09-29 RX ORDER — DOCUSATE SODIUM 100 MG/1
100 CAPSULE, LIQUID FILLED ORAL 2 TIMES DAILY
Qty: 60 CAP | Refills: 2 | Status: SHIPPED | OUTPATIENT
Start: 2019-09-29 | End: 2019-11-14

## 2019-09-29 RX ADMIN — IBUPROFEN 800 MG: 800 TABLET, FILM COATED ORAL at 09:47

## 2019-09-29 RX ADMIN — IBUPROFEN 800 MG: 800 TABLET, FILM COATED ORAL at 01:39

## 2019-09-29 RX ADMIN — DOCUSATE SODIUM 100 MG: 100 CAPSULE ORAL at 09:47

## 2019-09-29 NOTE — DISCHARGE SUMMARY
Obstetrical Discharge Summary     Name: Lexi Bacon MRN: [id number]  SSN: [id number]    YOB: 1990  Age: 26 y.o. Sex: female      Allergies: Patient has no known allergies. Admit Date: 2019    Discharge Date: 2019     Admitting Physician: José Antonio Anderson MD     Attending Physician:  Chris Santiago MD     * Admission Diagnoses: 39 weeks gestation of pregnancy [Z3A.39]; Positive GBS test [B95.1]; Encounter for induction of labor [Z34.90]    * Discharge Diagnoses:   Information for the patient's :  Justo Chavez [[id number]]   Delivery of a 6 lb 13 oz (3.09 kg) female infant via Vaginal, Spontaneous on 2019 at 1:57 PM  by José Antonio Anderson. Apgars were 9  and 9 . Additional Diagnoses:   Hospital Problems as of 2019 Date Reviewed: 2019          Codes Class Noted - Resolved POA    39 weeks gestation of pregnancy ICD-10-CM: Z3A.39  ICD-9-CM: V22.2  2019 - Present Yes        Encounter for induction of labor ICD-10-CM: Z34.90  ICD-9-CM: V22.1  2019 - Present Yes        Positive GBS test ICD-10-CM: B95.1  ICD-9-CM: 041.02  2019 - Present Yes        Anemia affecting pregnancy in third trimester ICD-10-CM: O99.013  ICD-9-CM: 648.23, 285.9  2019 - Present Yes    Overview Addendum 2019  9:06 AM by Emma Chauhan RN     2019 at [facility]: Hgb 9.8 on , PO Iron   2019 at [facility]:  No recent Hgb. · Recheck Hgb at next visit in OB office; if < 9.5, please refer to hematology. See GDM Overview             * (Principal) Oral hypoglycemic controlled White classification A2 gestational diabetes mellitus (GDM) ICD-10-CM: O24.415  ICD-9-CM: 648.80  2019 - Present Yes    Overview Addendum 2019  5:07 PM by Wendie Halsted, MD     2019 at [facility]: Appropriate fetal growth. AC 31%, overall 36%, SHIRLEY 20 cm, UA Dopplers WNL, BPP . Glucose log reviewed. Ranges from 73 to 159.  Just a few PP elevations, good control. Reinforced QID checking and low-carb meal options. Current Regimen is diet control. 8/8/2019: Glucose log reviewed. Ranges from 78 to 159. A few elevations, good control. 8/20/2019 at [facility]:  Appropriate fetal growth, reassuring fetal status. AC 56%, overall 47%, SHIRLEY 10.0 cm, BPP 8/8. Did not bring BG logbook. States had a yard sale and couldn't find her glucometer; found it yesterday and numbers yesterday looked good. Current Regimen is diet control. 9/12/2019: Glucose log reviewed. Ranges from 86 to 155. About half of readings are mildly elevated. If elevations continue, consider medication. Watch carbs with meals, especially with breakfast.       · Continue QID BG testing and send logs weekly to OB and MFM. · Continue diet control and increase activity. Adjust medication if elevations become consistent. · Weekly BPP with OB at 37 weeks. Delivery by 41 weeks. 9/12/19:  Now A2DM  Fastings all elevated  1hr pp wnl  start Rx Metformin 1000 QHS; given needing meds recommend delivery at 39wks     Will be 39w0d on 9/27/19               Grand multiparity with current pregnancy, antepartum ICD-10-CM: O09.40  ICD-9-CM: 659.40  3/21/2019 - Present Yes    Overview Addendum 8/20/2019  9:07 AM by Todd Morales, ONOFRE     See GDM Overview             Obesity affecting pregnancy in third trimester ICD-10-CM: O99.213  ICD-9-CM: 649.13  3/21/2019 - Present Yes    Overview Addendum 8/20/2019  1:21 PM by Nasir Coates MD     Early glucola wnl , repeat 28wks __     5/14/2019 at [facility]:  Passed early glucola on 4/16; results of 83. See GDM Overview             High-risk pregnancy in third trimester ICD-10-CM: O09.93  ICD-9-CM: V23.9  3/21/2019 - Present Yes    Overview Addendum 8/20/2019  9:32 AM by Nasir Coates MD     3/21/2019 at [facility]:  Normal NT and nasal bone.   Genetic counseling done by MD.  Declines genetic testing.    5/14/2019 at [facility]:  Normal anatomy, limited fetal Echo but appeared normal.  2019 [facility]: Normal repeat echo; limited septum views, AC 22%, overall 34%, SHIRLEY 20 cm, BPP 2019 [facility]: EFW 47%, AC 56%. Normal pierre. MFM as needed    See GDM Overview             Maternal care due to low transverse uterine scar from previous  delivery ICD-10-CM: O34.211  ICD-9-CM: 654.20  3/21/2019 - Present Yes    Overview Addendum 2019  9:07 AM by Torres Daniel RN     G1 C/S,  x4   3/21/2019 at [facility]:  Wants     See GDM Overview                  Lab Results   Component Value Date/Time    ABO/Rh(D) A POSITIVE 2019 07:58 AM      Immunization History   Administered Date(s) Administered    Influenza Vaccine (Quad) PF 2019    Tdap 2018       * Procedures: vag delivery        [place]  Depression Scale  I have been able to laugh and see the funny side of things: As much as I always could  I have looked forward with enjoyment to things: As much as I ever did  I have blamed myself unnecessarily when things went wrong: No, never  I have been anxious or worried for no good reason: No, not at all  I have felt scared or panicky for no very good reason: No, not at all  Things have been getting on top of me: No, I have been coping as well as ever  I have been so unhappy that I have had difficulty sleeping: No, not at all  I have felt sad or miserable: Not very often  I have been so unhappy that I have been crying: No, never  The thought of harming myself has occurred to me: Never  Total Score: 1    * Discharge Condition: good    * Hospital Course: Normal hospital course following the delivery. * Disposition: Home    Discharge Medications:   Current Discharge Medication List      START taking these medications    Details   docusate sodium (COLACE) 100 mg capsule Take 1 Cap by mouth two (2) times a day for 90 days. Qty: 60 Cap, Refills: 2      ibuprofen (MOTRIN) 800 mg tablet Take 1 Tab by mouth every eight (8) hours.   Qty: 90 Tab, Refills: 0 CONTINUE these medications which have NOT CHANGED    Details   ferrous sulfate (IRON) 325 mg (65 mg iron) tablet Take 1 Tab by mouth two (2) times a day. Qty: 30 Tab, Refills: 4      acetaminophen (TYLENOL EXTRA STRENGTH) 500 mg tablet Take  by mouth every six (6) hours as needed for Pain. ascorbic acid, vitamin C, (VITAMIN C) 500 mg tablet Take 1 Tab by mouth two (2) times a day. Qty: 30 Tab, Refills: 4      albuterol (PROVENTIL HFA, VENTOLIN HFA, PROAIR HFA) 90 mcg/actuation inhaler Take 2 Puffs by inhalation every four (4) hours as needed for Wheezing. Qty: 1 Inhaler, Refills: 0      cholecalciferol, vitamin D3, (VITAMIN D3) 2,000 unit tab Take  by mouth. PNV#16-Iron Fum & PS-FA-OM-3 35-1-200 mg cap Take  by mouth. PNV gummy         STOP taking these medications       metFORMIN (GLUCOPHAGE) 1,000 mg tablet Comments:   Reason for Stopping:         glucose blood VI test strips (ASCENSIA AUTODISC VI, ONE TOUCH ULTRA TEST VI) strip Comments:   Reason for Stopping:         lancets misc Comments:   Reason for Stopping:         Blood-Glucose Meter monitoring kit Comments:   Reason for Stopping:               * Follow-up Care/Patient Instructions:   Activity: No sex for 6 weeks and No driving while on analgesics  Diet: Diabetic Diet  Wound Care: As directed    Follow-up Information     Follow up With Specialties Details Why Contact Info    Julio C Livingston MD Internal Medicine   [address] [place] [address]  [address]  [phone]

## 2019-09-29 NOTE — LACTATION NOTE
In to see mom and infant for discharge. Mom has sore nipple. She is pumping both breasts and offering back expressed breast milk when came in. Worked w/ mom to help her give back milk w/ curve tip syringe and finger feeding method as she states she struggled w/ it earlier. With coaching she did well. Gave feeding plan for guidance at home as needed if not always direct breast feeding. Mom has pump at home. Reviewed how to manage period of engorgement and discharge instructions.

## 2019-09-29 NOTE — DISCHARGE INSTRUCTIONS
Patient Education        After Your Delivery (the Postpartum Period): Care Instructions  Your Care Instructions    Congratulations on the birth of your baby. Like pregnancy, the  period can be a time of excitement, sara, and exhaustion. You may look at your wondrous little baby and feel happy. You may also be overwhelmed by your new sleep hours and new responsibilities. At first, babies often sleep during the days and are awake at night. They do not have a pattern or routine. They may make sudden gasps, jerk themselves awake, or look like they have crossed eyes. These are all normal, and they may even make you smile. In these first weeks after delivery, try to take good care of yourself. It may take 4 to 6 weeks to feel like yourself again, and possibly longer if you had a  birth. You will likely feel very tired for several weeks. Your days will be full of ups and downs, but lots of sara as well. Follow-up care is a key part of your treatment and safety. Be sure to make and go to all appointments, and call your doctor if you are having problems. It's also a good idea to know your test results and keep a list of the medicines you take. How can you care for yourself at home? Take care of your body after delivery  · Use pads instead of tampons for the bloody flow that may last as long as 2 weeks. · Ease cramps with ibuprofen (Advil, Motrin). · Ease soreness of hemorrhoids and the area between your vagina and rectum with ice compresses or witch hazel pads. · Ease constipation by drinking lots of fluid and eating high-fiber foods. Ask your doctor about over-the-counter stool softeners. · Cleanse yourself with a gentle squeeze of warm water from a bottle instead of wiping with toilet paper. · Take a sitz bath in warm water several times a day. · Wear a good nursing bra. Ease sore and swollen breasts with warm, wet washcloths.   · If you are not breastfeeding, use ice rather than heat for breast soreness. · Your period may not start for several months if you are breastfeeding. You may bleed more, and longer at first, than you did before you got pregnant. · Wait until you are healed (about 4 to 6 weeks) before you have sexual intercourse. Your doctor will tell you when it is okay to have sex. · Try not to travel with your baby for 5 or 6 weeks. If you take a long car trip, make frequent stops to walk around and stretch. Avoid exhaustion  · Rest every day. Try to nap when your baby naps. · Ask another adult to be with you for a few days after delivery. · Plan for  if you have other children. · Stay flexible so you can eat at odd hours and sleep when you need to. Both you and your baby are making new schedules. · Plan small trips to get out of the house. Change can make you feel less tired. · Ask for help with housework, cooking, and shopping. Remind yourself that your job is to care for your baby. Know about help for postpartum depression  · \"Baby blues\" are common for the first 1 to 2 weeks after birth. You may cry or feel sad or irritable for no reason. · Rest whenever you can. Being tired makes it harder to handle your emotions. · Go for walks with your baby. · Talk to your partner, friends, and family about your feelings. · If your symptoms last for more than a few weeks, or if you feel very depressed, ask your doctor for help. · Postpartum depression can be treated. Support groups and counseling can help. Sometimes medicine can also help. Stay healthy  · Eat healthy foods so you have more energy and lose extra baby pounds. · If you breastfeed, avoid drugs. If you quit smoking during pregnancy, try to stay smoke-free. If you choose to have a drink now and then, have only one drink, and limit the number of occasions that you have a drink. Wait to breastfeed at least 2 hours after you have a drink to reduce the amount of alcohol the baby may get in the milk.   · Start daily exercise after 4 to 6 weeks, but rest when you feel tired. · Learn exercises to tone your belly. Do Kegel exercises to regain strength in your pelvic muscles. You can do these exercises while you stand or sit. ? Squeeze the same muscles you would use to stop your urine. Your belly and thighs should not move. ? Hold the squeeze for 3 seconds, and then relax for 3 seconds. ? Start with 3 seconds. Then add 1 second each week until you are able to squeeze for 10 seconds. ? Repeat the exercise 10 to 15 times for each session. Do three or more sessions each day. · Find a class for new mothers and new babies that has an exercise time. · If you had a  birth, give yourself a bit more time before you exercise, and be careful. When should you call for help? Call 911 anytime you think you may need emergency care. For example, call if:    · You have thoughts of harming yourself, your baby, or another person.     · You passed out (lost consciousness).     · You have chest pain, are short of breath, or cough up blood.     · You have a seizure.    Call your doctor now or seek immediate medical care if:    · You have severe vaginal bleeding. This means you are passing blood clots and soaking through a pad each hour for 2 or more hours.     · You are dizzy or lightheaded, or you feel like you may faint.     · You have a fever.     · You have new or more belly pain.     · You have signs of a blood clot in your leg (called a deep vein thrombosis), such as:  ? Pain in the calf, back of the knee, thigh, or groin. ? Redness and swelling in your leg or groin.     · You have signs of preeclampsia, such as:  ? Sudden swelling of your face, hands, or feet. ? New vision problems (such as dimness, blurring, or seeing spots).   ? A severe headache.    Watch closely for changes in your health, and be sure to contact your doctor if:    · Your vaginal bleeding seems to be getting heavier.     · You have new or worse vaginal discharge.     · You feel sad, anxious, or hopeless for more than a few days.     · You do not get better as expected. Where can you learn more? Go to http://chloe-christy.info/. Enter A461 in the search box to learn more about \"After Your Delivery (the Postpartum Period): Care Instructions. \"  Current as of: May 29, 2019  Content Version: 12.2  © 1948-3996 [employer]. Care instructions adapted under license by [employer] (which disclaims liability or warranty for this information). If you have questions about a medical condition or this instruction, always ask your healthcare professional. [place] [address] any warranty or liability for your use of this information. DISCHARGE SUMMARY from Nurse    PATIENT INSTRUCTIONS:    After general anesthesia or intravenous sedation, for 24 hours or while taking prescription Narcotics:  · Limit your activities  · Do not drive and operate hazardous machinery  · Do not make important personal or business decisions  · Do  not drink alcoholic beverages  · If you have not urinated within 8 hours after discharge, please contact your surgeon on call. Report the following to your surgeon:  · Excessive pain, swelling, redness or odor of or around the surgical area  · Temperature over 100.5  · Nausea and vomiting lasting longer than 4 hours or if unable to take medications  · Any signs of decreased circulation or nerve impairment to extremity: change in color, persistent  numbness, tingling, coldness or increase pain  · Any questions    What to do at Home:    Nothing in the vagina for 6 weeks. Call MD if experiencing heavy vaginal bleeding greater than 1 pad per hour, temperature over 100.4, foul smelling vaginal discharge, thoughts of suicide or homicide, symptoms of postpartum depression or mastitis, or any other major medical concerns.             *  Please give a list of your current medications to your Primary Care Provider. *  Please update this list whenever your medications are discontinued, doses are      changed, or new medications (including over-the-counter products) are added. *  Please carry medication information at all times in case of emergency situations. These are general instructions for a healthy lifestyle:    No smoking/ No tobacco products/ Avoid exposure to second hand smoke  Surgeon General's Warning:  Quitting smoking now greatly reduces serious risk to your health. Obesity, smoking, and sedentary lifestyle greatly increases your risk for illness    A healthy diet, regular physical exercise & weight monitoring are important for maintaining a healthy lifestyle    You may be retaining fluid if you have a history of heart failure or if you experience any of the following symptoms:  Weight gain of 3 pounds or more overnight or 5 pounds in a week, increased swelling in our hands or feet or shortness of breath while lying flat in bed. Please call your doctor as soon as you notice any of these symptoms; do not wait until your next office visit. The discharge information has been reviewed with the patient. The patient verbalized understanding. Discharge medications reviewed with the patient and appropriate educational materials and side effects teaching were provided.   ___________________________________________________________________________________________________________________________________

## 2019-09-29 NOTE — PROGRESS NOTES
Patient discharged to home per MD orders. Discharge instructions reviewed with patient. Questions encouraged and answered. Patient verbalizes understanding.        Patient to call out when ready to be escorted out

## 2019-09-29 NOTE — PROGRESS NOTES
Provided Postpartum Depression packet. Patient's chart show's Dr. Rufina Luque as PCP however pt says she has not seen a PCP. Provided list of PCP's and made referral to [facility] for assistance.

## 2019-09-29 NOTE — PROGRESS NOTES
Post-Partum Day Number 2 Progress/Discharge Note  Lexi Bacon  [id number]    Patient doing well post-partum without significant complaint. Voiding without difficulty, normal lochia, positive flatus. Vitals:    Patient Vitals for the past 8 hrs:   BP Temp Pulse Resp SpO2   19 0749 114/69 98.3 °F (36.8 °C) 61 18 99 %     Temp (24hrs), Av.4 °F (36.9 °C), Min:98.3 °F (36.8 °C), Max:98.4 °F (36.9 °C)      Vital signs stable, afebrile. Exam:  Patient without distress. Abdomen soft, fundus firm at level of umbilicus, nontender              Labs: No results found for this or any previous visit (from the past 24 hour(s)). Assessment and Plan:  Patient appears to be having uncomplicated post-partum course. Continue routine perineal care and maternal education. Plan discharge for today with follow up in our office in 6 weeks.

## 2019-09-29 NOTE — LACTATION NOTE
This note was copied from a baby's chart. Individualized Feeding Plan for Breastfeeding   Lactation Services [phone]      As much as possible, hold your baby on your chest so babys bare skin is against your bare skin with a blanket covering babys back, especially 30 minutes before it is time for baby to eat. Watch for early feeding cues such as, licking lips, sucking motions, rooting, hands to mouth. Crying is a late feeding cue. Feed your baby at least 8 times in 24 hours, or more if your baby is showing feeding cues. If baby is sleepy put baby skin to skin and watch for hunger cues. To rouse baby: unwrap, undress, massage hands, feet, & back, change diaper, gently change babys position from lying to sitting. 15-20 minutes on the first breast of active breastfeeding is considered a good feeding. Good, active breastfeeding is when baby is alert, tugging the nipple, their ear may move, and you can hear swallows. Allow baby to finish the first side before changing sides. Sleeping at the breast or only brief, light sucks should not be considered a good, full breastfeed. At each feeding:  __x__1. Do Suck Practice on finger before each feeding until sucking pattern is smooth. Try using index finger. Nail down towards tongue. __x__2. Hand Express for a few minutes prior to latching to help start milk flow. __x__3. Baby needs to NURSE WELL x 15-20 minutes on at least first breast, burp and offer 2nd breast at every feeding. If no sustained latch only attempt at breast for 10 minutes. If baby does not latch on and feed well on at least one side, you should:   __x__4. Double pump for 15 minutes with breast massage and compression. Hand express for an additional 2-3 minutes per side. Pump after each feeding attempt or poor feeding, up to 8 times per day. If you are not putting baby to the breast you need to pump 8 times a day. Pump every 3 hours. __x__5.  Give baby all of the breast milk you obtain using a straight syringe or  curved syringe. If baby does NOT have enough wet and dirty diapers per day, is jaundiced/lethargic, or has significant weight loss AND you do NOT pump enough milk for each feeding (per volume listed below), formula supplementation may need to be used. Call lactation department /pediatrician if you have concerns. AVERAGE INTAKES OF COLOSTRUM BY HEALTHY  INFANTS:  Time  Day Intake (ml/feed)  Based on 8 feedings per day. 1st 24 hrs  1 2-10 ml  24-48 hrs  2 5-15 ml  48-72 hrs  3 15-30 ml (0.5-1 oz)  72-96 hrs  4 30-45 ml (1-1.5oz)                          5-6      45-60 ml (1.5-2oz)                           7         60-75 ml (2-2.5oz)    By day 7, baby will need 68 ml or 2.25 oz at each feeding based on 8 feedings per day & babys weight. (1oz = 30ml). Total milk volume needed in 24 hours by Day 7 is 18 oz per day based on baby's birthweight of 6lb 13oz. The more often baby eats, the less volume they need per feeding. If baby is eating more often than the minimum of 8 times per day, they may take less per feeding. Comments:     Use feeding plan until follow up with pediatrician. Continue to attempt at the breast for most feeds. Pump every 3 hours if no latch. Give all pumped colostrum/breastmilk at each feeding. OUTPATIENT APPOINTMENT SCHEDULED FOR :    Outpatient services are located on the 4th floor at [facility]. Check in at the 4th floor registration desk (the same one you used when you came to have your baby).   Call for questions [phone]

## 2019-10-03 PROBLEM — E66.01 OBESITY, MORBID (HCC): Status: ACTIVE | Noted: 2019-10-03

## 2019-10-14 PROBLEM — R06.2 WHEEZING: Status: RESOLVED | Noted: 2019-08-20 | Resolved: 2019-10-14

## 2019-10-14 PROBLEM — M25.551 BILATERAL HIP PAIN: Status: ACTIVE | Noted: 2019-10-14

## 2019-10-14 PROBLEM — Z34.90 ENCOUNTER FOR INDUCTION OF LABOR: Status: RESOLVED | Noted: 2019-09-27 | Resolved: 2019-10-14

## 2019-10-14 PROBLEM — B95.1 POSITIVE GBS TEST: Status: RESOLVED | Noted: 2019-09-17 | Resolved: 2019-10-14

## 2019-10-14 PROBLEM — Z3A.39 39 WEEKS GESTATION OF PREGNANCY: Status: RESOLVED | Noted: 2019-09-27 | Resolved: 2019-10-14

## 2019-10-14 PROBLEM — E66.01 OBESITY, MORBID (HCC): Status: RESOLVED | Noted: 2019-10-03 | Resolved: 2019-10-14

## 2019-10-14 PROBLEM — O24.415 ORAL HYPOGLYCEMIC CONTROLLED WHITE CLASSIFICATION A2 GESTATIONAL DIABETES MELLITUS (GDM): Status: RESOLVED | Noted: 2019-07-25 | Resolved: 2019-10-14

## 2019-10-14 PROBLEM — G44.219 EPISODIC TENSION-TYPE HEADACHE, NOT INTRACTABLE: Status: ACTIVE | Noted: 2019-10-14

## 2019-10-14 PROBLEM — O99.213 OBESITY AFFECTING PREGNANCY IN THIRD TRIMESTER: Status: RESOLVED | Noted: 2019-03-21 | Resolved: 2019-10-14

## 2019-10-14 PROBLEM — O09.93 HIGH-RISK PREGNANCY IN THIRD TRIMESTER: Status: RESOLVED | Noted: 2019-03-21 | Resolved: 2019-10-14

## 2019-10-14 PROBLEM — E66.01 OBESITY, MORBID (HCC): Status: ACTIVE | Noted: 2019-10-14

## 2019-10-14 PROBLEM — O34.211 MATERNAL CARE DUE TO LOW TRANSVERSE UTERINE SCAR FROM PREVIOUS CESAREAN DELIVERY: Status: RESOLVED | Noted: 2019-03-21 | Resolved: 2019-10-14

## 2019-10-14 PROBLEM — B37.2 CANDIDIASIS, INTERTRIGO: Status: ACTIVE | Noted: 2019-10-14

## 2019-10-14 PROBLEM — M25.552 BILATERAL HIP PAIN: Status: ACTIVE | Noted: 2019-10-14

## 2019-10-14 PROBLEM — O99.013 ANEMIA AFFECTING PREGNANCY IN THIRD TRIMESTER: Status: RESOLVED | Noted: 2019-07-30 | Resolved: 2019-10-14

## 2019-10-14 PROBLEM — O09.40 GRAND MULTIPARITY WITH CURRENT PREGNANCY, ANTEPARTUM: Status: RESOLVED | Noted: 2019-03-21 | Resolved: 2019-10-14

## 2019-10-31 ENCOUNTER — APPOINTMENT (OUTPATIENT)
Dept: PHYSICAL THERAPY | Age: 29
End: 2019-10-31

## 2019-10-31 ENCOUNTER — HOSPITAL ENCOUNTER (OUTPATIENT)
Dept: PHYSICAL THERAPY | Age: 29
Discharge: HOME | End: 2019-10-31
Attending: FAMILY MEDICINE

## 2019-10-31 NOTE — THERAPY EVALUATION
Elvin Gillespie  : 1990  Payor: ABSOLUTE TOTAL CARE / [place] / Product Type: Managed Care Medicaid /  [address] [place]  [facility], [address]  [phone]   [phone]              OUTPATIENT PHYSICAL THERAPY:Initial Assessment 10/31/2019    ICD-10: Treatment Diagnosis:   Cervicalgia (M54.2)     Muscle weakness (generalized) (M62.81)    Precautions/Allergies:   Patient has no known allergies. Fall Risk Score: 0 (? 5 = High Risk)    MD Orders: Eval and Treat MEDICAL/REFERRING DIAGNOSIS:  Episodic tension-type headache, not intractable [G44.219]  Bilateral hip pain [M25.551, M25.552]     DATE OF ONSET: 1 month ago    REFERRING PHYSICIAN: Briseida Patel DO    RETURN PHYSICIAN APPOINTMENT: TBD by patient      Ambulatory/Rehab Services H2 Model Falls Risk Assessment    Risk Factors:       No Risk Factors Identified Ability to Rise from Chair:       (0)  Ability to rise in a single movement    Falls Prevention Plan:       No modifications necessary   Total: (5 or greater = High Risk): 0    © [facility] of [employer]. All Rights Reserved. [facility] Patent #[id number]. Federal Law prohibits the replication, distribution or use without written permission from [employer]          INITIAL ASSESSMENT:  Ms. Elvin Gillespie has attended 1 physical therapy session including initial evaluation. She presents with symptoms associated with cervicogenic headaches. She denies numbness or tingling in arm. She displayed increased tone and tenderness to R cervical paraspinals and suboccipital musculature. Decreased L lateral downgliding. Elevated L first rib. Slightly decreased cervical mobility. Strength is WFL B UE. Neuroscreen negative. Elvin Gillespie will benefit from home exercise program, therapeutic and postural strengthening exercises, manual therapeutic techniques (ie. Distraction, SOR, myofascial release/soft tissue mobilization) as appropriate to address Amarjit Fried's current condition. Talita Wagner will benefit from skilled PT (medically necessary) to address above deficits affecting participation in basic ADLs and overall functional tolerance. PROBLEM LIST (Impacting functional limitations):  1. Decreased Strength  2. Decreased ADL/Functional Activities  3. Decreased Transfer Abilities  4. Increased Pain  5. Decreased Activity Tolerance  6. Increased Fatigue  7. Increased Shortness of Breath  8. Decreased Flexibility/Joint Mobility  9. Decreased [place] with Home Exercise Program INTERVENTIONS PLANNED:  1. Balance Exercise  2. Bed Mobility  3. Cold  4. Cryotherapy  5. Family Education  6. Gait Training  7. Heat  8. Home Exercise Program (HEP)  9. Manual Therapy  10. Neuromuscular Re-education/Strengthening  11. Range of Motion (ROM)  12. Therapeutic Activites  13. Therapeutic Exercise/Strengthening  14. Transfer Training  15. Mechanical Traction  16. Electrical Stimulation   TREATMENT PLAN:  Effective Dates: 10/31/2019 TO 1/29/2020 (90 days). Frequency/Duration: 2 times a week for 90 Days    GOALS: (Goals have been discussed and agreed upon with patient.)  SHORT-TERM FUNCTIONAL GOALS: Time Frame: 4 weeks  1. Talita Wagner will report <=5/10 pain to L cervical spine and headaches with performance of functional spinal mobility and rotation while driving. 2.  Talita Wagner will demonstrate improved NDI score, indicating spinal improvement from 16/50 to 11/50 affecting minimal to no difficulty with performance of cervical mobility and strengthening. 3.  Talita Wagner will improve ROM to >=90% to assist with improved function during instrumental activities of daily living. 4.  Talita Wagner will improve MMT to >=4+/5 to all UE strengthening to return to PLOF and improve functional tolerance.     DISCHARGE GOALS: Time Frame: 12 weeks  1. Elvin Gillespie will report <=3/10 pain to L cervical spine and headaches with performance of functional spinal mobility and rotation while driving. 2. Elvin Gillespie will demonstrate improved NDI score, indicating spinal improvement from 16/50 to 6/50 affecting minimal to no difficulty with performance of cervical mobility and strengthening. 3. Elvin Gillespie to be independent with advanced HEP for cervical region and UE's. Rehabilitation Potential For Stated Goals: GOOD  Regarding Ludwig Fried's therapy, I certify that the treatment plan above will be carried out by a therapist or under their direction. Thank you for this referral,  Delroy Beavers, PT, DPT     Referring Physician Signature: Briseida Patel, DO              Date                    HISTORY:   PT STATED GOAL:  Pt states she would like to reduce the frequency of her headaches. HISTORY OF PRESENT INJURY/ILLNESS (REASON FOR REFERRAL):  Pt notes she has been having frequent headaches that stem from her neck region. She notes it starts at the base of her L cervical region and radiates around her head in deirdre horn pattern. She notes she gets a headache almost every other day and states the pain can get as high as 6/10 and as low as 0/10. She states it is a dull and achy headache but it causes her not to want to talk or turn her head. She denies any numbness and tingling in her arm and arms. Pt states she is having the most difficulty driving, concentrating, sitting long periods of time, caring for her children, bathing her children, house work. Pt states she is currently nursing which makes the pain worse. She notes light sensitivity when the headache is very bad. She states medications are not helping reduce the headaches. She notes she has not tried heat on her neck.      KEY NOTES FORs HISTORY Date: 10/31/2019   Mechanism of injury Insidious   Prior history of cervical back pain Yes   Fear, depression, anxiety no   Behavior of condition Acute on chronic   Irritability moderate   Previous failed treatments Yes. OTC Nsaid   Sedentary lifestyle yes       PAST MEDICAL HISTORY/CO-MORBIDITIES:   Ms. Mariela Jorge  has a past medical history of Anemia and Diabetes ([address] She also has no past medical history of Abnormal Papanicolaou smear of cervix, Asthma, Breast disorder, Chlamydia, Complication of anesthesia, DVT (deep venous thrombosis) ([address] Eclampsia, Ectopic pregnancy, Epilepsy ([address] Essential hypertension, Genital herpes, Gestational diabetes, Gestational hypertension, Gonorrhea, Heart abnormality, Herpes gestationis, Herpes simplex virus (HSV) infection, Human immunodeficiency virus (HIV) disease ([address] Hypertension, Hyperthyroidism, Hypothyroidism, Infertility, female, Kidney disease, Liver disease, Molar pregnancy, Nicotine vapor product user, Non-nicotine vapor product user, Phlebitis and thrombophlebitis, Pituitary disorder ([address] Polycystic disease, ovaries, Postpartum depression, Preeclampsia,  delivery, Psychiatric problem, Rhesus isoimmunization affecting pregnancy, Sickle cell disease ([address] Sickle cell trait syndrome ([address] Syphilis, Systemic lupus erythematosus ([address] Thyroid activity decreased, Trauma, Type I diabetes mellitus ([address] or  (vaginal birth after ). Ms. Mariela Jorge  has a past surgical history that includes hx tonsillectomy; appendectomy,w othr c; hx  section; and hx appendectomy.     Active Ambulatory Problems     Diagnosis Date Noted    Obesity, morbid ([address] 10/14/2019    Episodic tension-type headache, not intractable 10/14/2019    Bilateral hip pain 10/14/2019    Candidiasis, intertrigo 10/14/2019     Resolved Ambulatory Problems     Diagnosis Date Noted    Grand multiparity with current pregnancy, antepartum 2019    Family history of factor V Leiden mutation 2019    Obesity affecting pregnancy in third trimester 2019    High-risk pregnancy in third trimester 2019    Maternal care due to low transverse uterine scar from previous  delivery 2019    Oral hypoglycemic controlled White classification A2 gestational diabetes mellitus (GDM) 2019    Anemia affecting pregnancy in third trimester 2019    Wheezing 2019    Positive GBS test 2019    39 weeks gestation of pregnancy 2019    Encounter for induction of labor 2019    Obesity, morbid ([facility] 10/03/2019     Past Medical History:   Diagnosis Date    Anemia     Diabetes ([facility]        SOCIAL HISTORY/LIVING ENVIRONMENT:    Pt states she lives in a 2 story house with her  and 6 children.      Social History     Socioeconomic History    Marital status:      Spouse name: Not on file    Number of children: Not on file    Years of education: Not on file    Highest education level: Not on file   Occupational History    Not on file   Social Needs    Financial resource strain: Not on file    Food insecurity:     Worry: Not on file     Inability: Not on file    Transportation needs:     Medical: Not on file     Non-medical: Not on file   Tobacco Use    Smoking status: Never Smoker    Smokeless tobacco: Never Used   Substance and Sexual Activity    Alcohol use: No     Frequency: Never    Drug use: No    Sexual activity: Yes     Partners: Male     Birth control/protection: None   Lifestyle    Physical activity:     Days per week: Not on file     Minutes per session: Not on file    Stress: Not on file   Relationships    Social connections:     Talks on phone: Not on file     Gets together: Not on file     Attends [employer] service: Not on file     Active member of club or organization: Not on file     Attends meetings of clubs or organizations: Not on file     Relationship status: Not on file    Intimate partner violence:     Fear of current or ex partner: Not on file     Emotionally abused: Not on file     Physically abused: Not on file     Forced sexual activity: Not on file   Other Topics Concern     Service Not Asked    Blood Transfusions Not Asked    Caffeine Concern Not Asked    Occupational Exposure Not Asked    Hobby Hazards Not Asked    Sleep Concern Not Asked    Stress Concern Not Asked    Weight Concern Not Asked    Special Diet Not Asked    Back Care Not Asked    Exercise Not Asked    Bike Helmet Not Asked   [address] Not Asked    Self-Exams Not Asked   Social History Narrative    Not on file        LIFESTYLE Date: 10/31/2019   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 , Disp: 30 g, Rfl: 1    docusate sodium (COLACE) 100 mg capsule, Take 1 Cap by mouth two (2) times a day for 90 days. , Disp: 60 Cap, Rfl: 2    ibuprofen (MOTRIN) 800 mg tablet, Take 1 Tab by mouth every eight (8) hours. , Disp: 90 Tab, Rfl: 0    ferrous sulfate (IRON) 325 mg (65 mg iron) tablet, Take 1 Tab by mouth two (2) times a day., Disp: 30 Tab, Rfl: 4    ascorbic acid, vitamin C, (VITAMIN C) 500 mg tablet, Take 1 Tab by mouth two (2) times a day., Disp: 30 Tab, Rfl: 4    albuterol (PROVENTIL HFA, VENTOLIN HFA, PROAIR HFA) 90 mcg/actuation inhaler, Take 2 Puffs by inhalation every four (4) hours as needed for Wheezing., Disp: 1 Inhaler, Rfl: 0    cholecalciferol, vitamin D3, (VITAMIN D3) 2,000 unit tab, Take  by mouth., Disp: , Rfl:     acetaminophen (TYLENOL EXTRA STRENGTH) 500 mg tablet, Take  by mouth every six (6) hours as needed for Pain., Disp: , Rfl:      Date Last Reviewed:  10/31/2019     0: LOW COMPLEXITY EXAMINATION:   OBSERVATION/ORTHOSTATIC POSTURAL ASSESSMENT:  10/31/2019   -Pt sits with forward head and rounded shoulders which indicate tight anterior chest musculature, upper trapezius, and levator scapula and weak posterior scapula musculature and deep cervical flexors. Pt displays decreased core motor control indicating weak core and low back musculature.     PALPATION:  10/31/2019    -TTP:     -Tone:    - PA mobility:    -Lateral and down-glides:    -Transverse processes:    -1st rib:         -AROM/PROM Date: 10/31/2019  Date:  Date:    Joint: RIGHT LEFT RIGHT LEFT RIGHT LEFT   Cervical Flexion 30 degrees --       Cervical Extension 35 degrees  -Slight pain and pressure at base of L cervical region --       Cervical Side bending 40 degrees 40 degrees       Cervical Rotation 50 degrees   -Pulling at base of L cervical region 60 degrees       Shoulder Flexion [facility]       Shoulder Extension [facility]       Shoulder Abduction WFL WFL         -STRENGTH: Date: 10/31/2019  Date:  Date:    Joint: RIGHT LEFT RIGHT LEFT RIGHT LEFT   Shoulder Elevation 4+/5 4+/5       Shoulder Flexion 4+/5 4+/5       Shoulder Extension 4+/5 4+/5       Shoulder Abduction 4+/5 4+/5       Shoulder IR 4+/5 4+/5       Shoulder ER 4+/5 4+/5       Elbow Flexion 4+/5 4+/5       Elbow Extension 4+/5 4+/5        Strength 4+/5 4+/5         SPECIAL TESTS: Assessed @ Initial Visit   -Cervical Radiculopathy:       -Spurling's Test: Negative       -Cervical distraction test: Negative       -Upper limb tension test: Negative       -Cervical rotation <60 degrees to the ipsilateral side: Positive       -Sharp Pursar (instability of the atlanto-axial joint): Negative       -Cervical rotation lateral flexion test: Negative   -Instability of the atlanto-axial joint:       -Sharp-henri test for transverse ligament: Negative       -Alar ligament test (supine cervical rotation): Negative  *Denies dysarthria, diplopia, drop attacks, dizziness, dysphagia NEUROLOGICAL SCREEN:        -Radiating symptoms? Yes. Around head         -Dermatomes: Normal and equal B    -MYOTOMES  Date: 10/31/2019     Right Left   C1 & C2   (Neck Flx) 5/5 5/5   C3   (Neck SB) 5/5 5/5   C4  (Shldrelevation) 5/5 5/5   C5  (Shldr ABD) 5/5 5/5   C6   (Elbow Flx and wrist Ext) 5/5 5/5   C7  (Elbow Ext and wrist Flx) 5/5 5/5   C8  (Thumb ABD) 5/5 5/5   T1  (Digit ADD) 5/5 5/5        -REFLEXES Date: 10/31/2019     Right Left   C5  (Biceps Brachii) NT NT   C6  (Brachioradialis) NT NT   C7  (Triceps) NT NT         Body Structures Involved:  1. Nerves  2. Bones  3. Joints  4. Muscles  5. Ligaments Body Functions Affected:  1. Sensory/Pain  2. Neuromusculoskeletal  3. Movement Related Activities and Participation Affected:  1. Mobility  2. Self Care  3. Domestic Life  4. Interpersonal Interactions and Relationships  5. Community, Social and [place]   Number of elements that affect the Plan of Care: 4+: HIGH COMPLEXITY   CLINICAL PRESENTATION:   Presentation: Stable and uncomplicated: LOW COMPLEXITY   CLINICAL DECISION MAKING:   Outcome Measure: Tool Used: Neck Disability Index (NDI)  Score:  Initial: 16/50  Most Recent: X/50 (Date: -- )   Interpretation of Score: The Neck Disability Index is a revised form of the Oswestry Low Back Pain Index and is designed to measure the activities of daily living in person's with neck pain. Each section is scored on a 0-5 scale, 5 representing the greatest disability. The scores of each section are added together for a total score of 50. Payor[place] ABSOLUTE TOTAL CARE / [place] / Product Type: Managed Care Medicaid /     Medical Necessity:   · Skilled intervention continues to be required due to address above deficits affecting participation in basic ADLs and overall functional tolerance.     Reason for Services/Other Comments:  · Patient continues to require skilled intervention due to address above deficits affecting participation in basic ADLs and overall functional tolerance.    Use of outcome tool(s) and clinical judgement create a POC that gives a: Clear prediction of patient's progress: LOW COMPLEXITY        · Pain/ Symptoms: Initial:   3/10 Post Session:  3/10     TOTAL TREATMENT DURATION:  PT Patient Time In/Time Out  Time In: 0800  Time Out: 4735 Greta Rodriguez, PT, DPT

## 2019-11-18 ENCOUNTER — HOSPITAL ENCOUNTER (OUTPATIENT)
Dept: PHYSICAL THERAPY | Age: 29
Discharge: HOME | End: 2019-11-18
Attending: FAMILY MEDICINE
Payer: COMMERCIAL

## 2019-11-18 PROCEDURE — 97110 THERAPEUTIC EXERCISES: CPT

## 2019-11-18 PROCEDURE — 97140 MANUAL THERAPY 1/> REGIONS: CPT

## 2019-11-18 NOTE — PROGRESS NOTES
Gaby Byrd  : 1990  Payor[place] ABSOLUTE TOTAL CARE / [place] / Product Type: Managed Care Medicaid /  [address]  [facility] [address]  [phone]   [phone]      OUTPATIENT PHYSICAL THERAPY: Daily Treatment Note 2019  Visit Count:  2    ICD-10: Treatment Diagnosis:   Cervicalgia (M54.2)     Muscle weakness (generalized) (M62.81) TREATMENT PLAN:  Effective Dates: 10/31/2019 TO 2020 (90 days).    Frequency/Duration: 2 times a week for 90 Days       PRE-TREATMENT SYMPTOMS/COMPLAINTS:  Pt notes her lower back is bothering her significantly today. Notes she continues to have the tension headaches however they have not been as frequent. MEDICATIONS REVIEWED:  2019   TREATMENT:   (In addition to Assessment/Re-Assessment sessions the following treatments were rendered)    THERAPEUTIC EXERCISE: (20 minutes):  Exercises per grid below to improve mobility, strength and balance. Required minimal visual and verbal cues to promote proper body alignment and promote proper body posture. Progressed resistance, range and complexity of movement as indicated. Date:  2019 Date:   Date:     Activity/Exercise Parameters Parameters Parameters   Scifit 12min     Chin tucks 1x10 reps  Hold 10s                                     MANUAL THERAPY: (23 minutes): Joint mobilization, Soft tissue mobilization and Manipulation was utilized and necessary because of the patient's restricted joint motion, painful spasm, loss of articular motion and restricted motion of soft tissue. Pt supine for STM to B cervical paraspinals, suboccipital musculature, upper traps. Gentle lateral glides. Mobilization with movement to each segment.      (Used abbreviations: MET - muscle energy technique; PNF - proprioceptive neuromuscular facilitation; NMR - neuromuscular re-education; AP - anterior to posterior; PA - posterior to anterior)    MODALITIES: (0 minutes): *  Hot Pack Therapy in order to provide analgesia and relieve muscle spasm. Pt supine for moist heat to lower back while working on cervical region. TREATMENT/SESSION ASSESSMENT:  Amber Riddle noted her neck felt better after manual therapy. She displayed improved cervical rotation following session. Continued complaints of lower back pain. RECOMMENDATIONS/INTENT FOR NEXT TREATMENT SESSION: \"Next visit will focus on advancements to more challenging activities\".     PAIN: Initial: 4/10 Post Session:  2/10     [employer] Portal    Total Treatment Billable Duration:43min  PT Patient Time In/Time Out  Time In: 1100  Time Out: 1145  Jean Padilla, PT, DPT, COMT    Future Appointments   Date Time Provider Micki Anthony   11/22/2019  9:30 AM Nidhi Gutierrez PTA [facility]   11/25/2019  8:40 AM [address] [facility]   11/25/2019  1:45 PM [facility]   11/27/2019  3:15 PM Vamsi Pena PTA [facility]   12/4/2019  9:30 AM Abdias Knott PT, DPT [facility]   12/5/2019  8:45 AM Abdias Knott PT, DPT [facility]   12/10/2019  9:30 AM Nidhi Gutierrez PTA [facility]   12/13/2019  9:30 AM Abdias Knott PT, DPT [facility]   1/3/2020  9:20 AM Annette Keller DO [facility]

## 2019-11-22 ENCOUNTER — HOSPITAL ENCOUNTER (OUTPATIENT)
Dept: PHYSICAL THERAPY | Age: 29
Discharge: HOME | End: 2019-11-22
Attending: FAMILY MEDICINE
Payer: COMMERCIAL

## 2019-11-22 PROCEDURE — 97014 ELECTRIC STIMULATION THERAPY: CPT

## 2019-11-22 PROCEDURE — 97140 MANUAL THERAPY 1/> REGIONS: CPT

## 2019-11-22 PROCEDURE — 97110 THERAPEUTIC EXERCISES: CPT

## 2019-11-22 NOTE — PROGRESS NOTES
Adalberto Nails  : 1990  [place]or[place] ABSOLUTE TOTAL CARE / [place] / Product Type: Managed Care Medicaid /  [address]  at [facility]  [address], [address] [facility], [place] [address] [address] [facility]  [phone]   [phone]      OUTPATIENT PHYSICAL THERAPY: Daily Treatment Note 2019  Visit Count:  3    ICD-10: Treatment Diagnosis:   Cervicalgia (M54.2)     Muscle weakness (generalized) (M62.81) TREATMENT PLAN:  Effective Dates: 10/31/2019 TO 2020 (90 days).    Frequency/Duration: 2 times a week for 90 Days       PRE-TREATMENT SYMPTOMS/COMPLAINTS:  Pt reports she continues to have neck, upper back, and lower back pain with pain level 4/10 today. 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 MEDICATIONS REVIEWED:  2019   TREATMENT:   (In addition to Assessment/Re-Assessment sessions the following treatments were rendered)    THERAPEUTIC EXERCISE: (12  minutes):  Exercises per grid below to improve mobility, strength and balance. Required minimal visual and verbal cues to promote proper body alignment and promote proper body posture. Progressed resistance, range and complexity of movement as indicated. Date:  2019 Date:  19 Date:     Activity/Exercise Parameters Parameters Parameters   Scifit 12min 12 minutes  NuStep with arms and legs    Chin tucks 1x10 reps  Hold 10s                                     MANUAL THERAPY: (25 minutes): Joint mobilization, Soft tissue mobilization and Manipulation was utilized and necessary because of the patient's restricted joint motion, painful spasm, loss of articular motion and restricted motion of soft tissue. Patient sitting with arms supported for STM with hands and large [place] tool to bilateral upper traps and then pt supine for STM to B cervical paraspinals, suboccipital musculature, upper traps with suboccipital release and gentle manual traction. (Used abbreviations: MET - muscle energy technique; PNF - proprioceptive neuromuscular facilitation; NMR - neuromuscular re-education; AP - anterior to posterior; PA - posterior to anterior)    MODALITIES: (12 minutes): *  Hot Pack Therapy in order to provide analgesia and relieve muscle spasm. Pt supine with moist heat to neck and back with IFES wot bilateral upper traps and scapular borders to help decrease spasm and tightness to help decrease pain. TREATMENT/SESSION ASSESSMENT:  Adalberto Nails reported decreased pain to 1/10 today and improved cervical mobility. Continued complaints of lower back pain. RECOMMENDATIONS/INTENT FOR NEXT TREATMENT SESSION: \"Next visit will focus on advancements to more challenging activities\".     PAIN: Initial: 4/10 Post Session:  1/10     [employer] Portal    Total Treatment Billable Duration: 49min  PT Patient Time In/Time Out  Time In: 0930  Time Out: C/Kwaku Acosta PTA    Future Appointments   Date Time Provider Micki Anthony   11/25/2019  8:40 AM [address] [facility]   11/25/2019  1:45 PM Gab Bro PTA [facility]   11/27/2019  3:15 PM aGb Bro PTA [facility]   12/4/2019  9:30 AM Terra Carlisle PT, DPT [facility]   12/5/2019  8:45 AM Terra Carlisle PT, DPT [facility]   12/10/2019  9:30 AM Hiwot Calero PTA [facility]   12/13/2019  9:30 AM Terra Carlisle PT, DPT [facility]   1/3/2020  9:20 AM DO CARL Rasheed [facility]

## 2019-11-25 ENCOUNTER — HOSPITAL ENCOUNTER (OUTPATIENT)
Dept: PHYSICAL THERAPY | Age: 29
Discharge: HOME | End: 2019-11-25
Attending: FAMILY MEDICINE
Payer: COMMERCIAL

## 2019-11-25 PROCEDURE — 97110 THERAPEUTIC EXERCISES: CPT

## 2019-11-25 PROCEDURE — 97140 MANUAL THERAPY 1/> REGIONS: CPT

## 2019-11-25 NOTE — PROGRESS NOTES
Richard Chavez  : 1990  Payor: ABSOLUTE TOTAL CARE / [place] / Product Type: Managed Care Medicaid /  [address] [facility], [address], [place] [address] [address] [facility]  [phone]   [phone]      OUTPATIENT PHYSICAL THERAPY: Daily Treatment Note 2019  Visit Count:  4    ICD-10: Treatment Diagnosis:   Cervicalgia (M54.2)     Muscle weakness (generalized) (M62.81) TREATMENT PLAN:  Effective Dates: 10/31/2019 TO 2020 (90 days).    Frequency/Duration: 2 times a week for 90 Days       PRE-TREATMENT SYMPTOMS/COMPLAINTS:  Pt reports she is having a good bit of pain in her low back and it radiates to the left and right side. Patient presents with no baby today. She states baby is with the father out in the car. MEDICATIONS REVIEWED:  2019   TREATMENT:   (In addition to Assessment/Re-Assessment sessions the following treatments were rendered)    THERAPEUTIC EXERCISE: (20 minutes):  Exercises per grid below to improve mobility, strength and balance. Required minimal visual and verbal cues to promote proper body alignment and promote proper body posture. Progressed resistance, range and complexity of movement as indicated. Date:  2019 Date:  19 Date:     Activity/Exercise Parameters Parameters Parameters   Scifit 12min 12 minutes  NuStep with arms and legs    Chin tucks 1x10 reps  Hold 10s     Sleeping positions   Prone with pillows due to back pain at night                               MANUAL THERAPY: ( 25 minutes): Joint mobilization, Soft tissue mobilization and Manipulation was utilized and necessary because of the patient's restricted joint motion, painful spasm, loss of articular motion and restricted motion of soft tissue. Patient prone with pillows for STM with hands to bilateral upper traps and then pt supine for STM to B cervical paraspinals, suboccipital musculature, and upper traps. (Used abbreviations: MET - muscle energy technique; PNF - proprioceptive neuromuscular facilitation; NMR - neuromuscular re-education; AP - anterior to posterior; PA - posterior to anterior)    MODALITIES: (10 minutes): *  Hot Pack Therapy in order to provide analgesia and relieve muscle spasm. Pt prone with pillows for moist heat to neck and back with IFES to bilateral upper traps and scapular borders to help decrease spasm and tightness to help decrease pain. TREATMENT/SESSION ASSESSMENT:  Britany Dhaliwal reported decreased pain to 1/10 today and improved cervical mobility. Continued complaints of lower back pain. RECOMMENDATIONS/INTENT FOR NEXT TREATMENT SESSION: \"Next visit will focus on advancements to more challenging activities\".     PAIN: Initial: 4/10 Post Session:  1/10 in her neck, but back is 4/10      [facility]    Total Treatment Billable Duration: 40 min  PT Patient Time In/Time Out  Time In: 1345  Time Out: 7084  [facility]    Future Appointments   Date Time Provider Micki Anthony   11/27/2019  3:15 PM Joellen Dawkins PTA [facility]   12/4/2019  9:30 AM [facility]   12/5/2019  8:45 AM Vipul Villegas PT, DPT [facility]   12/10/2019  9:30 AM Frieda Leyden, PTA [facility]   12/13/2019  9:30 AM Vipul Villegas PT, DPT [facility]   1/3/2020  9:20 AM DO CARL Sigala G[facility]

## 2019-11-27 ENCOUNTER — HOSPITAL ENCOUNTER (OUTPATIENT)
Dept: PHYSICAL THERAPY | Age: 29
Discharge: HOME | End: 2019-11-27
Attending: FAMILY MEDICINE
Payer: COMMERCIAL

## 2019-11-27 NOTE — PROGRESS NOTES
Faiza Morejon  : 1990  [place]  Secondary:  [address]  at [address] [facility] [address], [address] [facility], [place] [address] [address] [facility]  [phone]   [phone]      2019 Patient called to cancel her therapy appointment today due to one of her children having pink eye. Will follow up with the patient at the next visit.   Irish Liriano, PTA

## 2019-12-04 ENCOUNTER — HOSPITAL ENCOUNTER (OUTPATIENT)
Dept: PHYSICAL THERAPY | Age: 29
Discharge: HOME | End: 2019-12-04
Attending: FAMILY MEDICINE
Payer: SELF-PAY

## 2019-12-04 PROCEDURE — 97140 MANUAL THERAPY 1/> REGIONS: CPT

## 2019-12-04 PROCEDURE — 97110 THERAPEUTIC EXERCISES: CPT

## 2019-12-04 NOTE — PROGRESS NOTES
Lexi Bacon  : 1990  Payor: ABSOLUTE TOTAL CARE / [place] / Product Type: Managed Care Medicaid /  [address]  at [address] [facility] [address], [address] [facility], [place] [address] [address] [facility]  [phone]   [phone]      OUTPATIENT PHYSICAL THERAPY: Daily Treatment Note 2019  Visit Count:  5    ICD-10: Treatment Diagnosis:   Cervicalgia (M54.2)     Muscle weakness (generalized) (M62.81) TREATMENT PLAN:  Effective Dates: 10/31/2019 TO 2020 (90 days).    Frequency/Duration: 2 times a week for 90 Days       PRE-TREATMENT SYMPTOMS/COMPLAINTS:  Pt reports she is not having as much pain in her cervical region. Notes  The frequency of her headaches has decreased. She notes she would like to finish with her neck episode and then submit to begin working on her back. She rates pain 4/10 today. MEDICATIONS REVIEWED:  2019   TREATMENT:   (In addition to Assessment/Re-Assessment sessions the following treatments were rendered)    THERAPEUTIC EXERCISE: (23 minutes):  Exercises per grid below to improve mobility, strength and balance. Required minimal visual and verbal cues to promote proper body alignment and promote proper body posture. Progressed resistance, range and complexity of movement as indicated.      Date:  2019 Date:  19 Date:  2019   Activity/Exercise Parameters Parameters Parameters   Scifit 12min 12 minutes  NuStep with arms and legs    Chin tucks 1x10 reps  Hold 10s  1x10 reps  Hold 10s   Sleeping positions   Prone with pillows due to back pain at night     Resisted shoulder extension   1x20 reps  Green tB   Resisted scap 2-way   1x20 reps  Green tB   Resisted shoulder adduction   1x20 reps  Green tB           MANUAL THERAPY: ( 20 minutes): Joint mobilization, Soft tissue mobilization and Manipulation was utilized and necessary because of the patient's restricted joint motion, painful spasm, loss of articular motion and restricted motion of soft tissue. Patient prone with pillows for STM with hands to bilateral upper traps and then pt supine for STM to B cervical paraspinals, suboccipital musculature, and upper traps. (Used abbreviations: MET - muscle energy technique; PNF - proprioceptive neuromuscular facilitation; NMR - neuromuscular re-education; AP - anterior to posterior; PA - posterior to anterior)    MODALITIES: (10 minutes): *  Hot Pack Therapy in order to provide analgesia and relieve muscle spasm. Pt prone with pillows for moist heat to neck and back with IFES to bilateral upper traps and scapular borders to help decrease spasm and tightness to help decrease pain. TREATMENT/SESSION ASSESSMENT:  Richard Chavez with decreased cervical tension following manual therapy. Improved posture following resisted exercises. RECOMMENDATIONS/INTENT FOR NEXT TREATMENT SESSION: \"Next visit will focus on advancements to more challenging activities\".     PAIN: Initial: 4/10 Post Session:  1/10 in her neck, but back is 4/10      [facility] Portal    Total Treatment Billable Duration: 43 min  PT Patient Time In/Time Out  Time In: 0930  Time Out: 1030  Elvin Donahue PT, DPT    Future Appointments   Date Time Provider Micki Anthony   12/5/2019  8:45 AM Roc Bray PT, DPT [facility]   12/10/2019  9:30 AM Khris Fleming PTA [facility]   12/13/2019  9:30 AM Roc Bray PT, DPT [facility]   1/3/2020  9:20 AM DO CARL Sarah

## 2019-12-05 ENCOUNTER — HOSPITAL ENCOUNTER (OUTPATIENT)
Dept: PHYSICAL THERAPY | Age: 29
Discharge: HOME | End: 2019-12-05
Attending: FAMILY MEDICINE
Payer: SELF-PAY

## 2019-12-05 PROCEDURE — 97140 MANUAL THERAPY 1/> REGIONS: CPT

## 2019-12-05 PROCEDURE — 97110 THERAPEUTIC EXERCISES: CPT

## 2019-12-05 NOTE — PROGRESS NOTES
Annalisa Mathur  : 1990  Payor: ABSOLUTE TOTAL CARE / [place] / Product Type: Managed Care Medicaid /  [address]  at [facility]  [address] [facility], [place] [address] [address] [facility]  [phone]   [phone]      OUTPATIENT PHYSICAL THERAPY: Daily Treatment Note 2019  Visit Count:  6    ICD-10: Treatment Diagnosis:   Cervicalgia (M54.2)     Muscle weakness (generalized) (M62.81) TREATMENT PLAN:  Effective Dates: 10/31/2019 TO 2020 (90 days).    Frequency/Duration: 2 times a week for 90 Days       PRE-TREATMENT SYMPTOMS/COMPLAINTS:  Pt reports she feels a headache coming on especially her L cervical and head region. She rates pain 4/10. MEDICATIONS REVIEWED:  2019   TREATMENT:   (In addition to Assessment/Re-Assessment sessions the following treatments were rendered)    THERAPEUTIC EXERCISE: (20 minutes):  Exercises per grid below to improve mobility, strength and balance. Required minimal visual and verbal cues to promote proper body alignment and promote proper body posture. Progressed resistance, range and complexity of movement as indicated.      Date:  2019 Date:  19 Date:  2019 Date:  2019   Activity/Exercise Parameters Parameters Parameters    Scifit 12min 12 minutes  NuStep with arms and legs  12 minutes  Level 3  NuStep with arms and legs   Chin tucks 1x10 reps  Hold 10s  1x10 reps  Hold 10s    Sleeping positions   Prone with pillows due to back pain at night      Resisted shoulder extension   1x20 reps  Green tB 1x20 reps  Green tB   Resisted scap 2-way   1x20 reps  Green tB 1x20 reps  Green tB   Resisted shoulder adduction   1x20 reps  Green tB 1x20 reps  Green tB            MANUAL THERAPY: ( 23 minutes): Joint mobilization, Soft tissue mobilization and Manipulation was utilized and necessary because of the patient's restricted joint motion, painful spasm, loss of articular motion and restricted motion of soft tissue. Patient prone with pillows for STM with hands to bilateral upper traps and then pt supine for STM to B cervical paraspinals, suboccipital musculature, and upper traps. gentlle lateral side glides to each segment. Gentle suboccipital traction. (Used abbreviations: MET - muscle energy technique; PNF - proprioceptive neuromuscular facilitation; NMR - neuromuscular re-education; AP - anterior to posterior; PA - posterior to anterior)    MODALITIES: (0 minutes): *  Hot Pack Therapy in order to provide analgesia and relieve muscle spasm. Pt prone with pillows for moist heat to neck and back with IFES to bilateral upper traps and scapular borders to help decrease spasm and tightness to help decrease pain. TREATMENT/SESSION ASSESSMENT:  Luster Awe with decreased cervical tension following manual therapy. Improved posture following resisted exercises. Pt stated her headache had subsidd following manual efforts. RECOMMENDATIONS/INTENT FOR NEXT TREATMENT SESSION: \"Next visit will focus on advancements to more challenging activities\".     PAIN: Initial: 4/10 Post Session:  1/10 in her neck, but back is 4/10      [facility] Portal    Total Treatment Billable Duration: 43 min  PT Patient Time In/Time Out  Time In: 0845  Time Out: 0930  Sameer Griggs PT, DPT    Future Appointments   Date Time Provider Micki Anthony   12/10/2019  9:30 AM Sotero Carrillo PTA [facility]   12/13/2019  9:30 AM Rebekah Soares PT, DPT [facility]   1/3/2020  9:20 AM DO CARL Aragon GFM G[facility]

## 2019-12-10 ENCOUNTER — HOSPITAL ENCOUNTER (OUTPATIENT)
Dept: PHYSICAL THERAPY | Age: 29
Discharge: HOME | End: 2019-12-10
Attending: FAMILY MEDICINE
Payer: SELF-PAY

## 2019-12-10 NOTE — PROGRESS NOTES
Therapy Center at [address], [address] [place] [address]  [phone]   [phone]     OUTPATIENT DAILY NOTE     DATE: 12/10/2019    SUBJECTIVE:  Patient called and cancelled for appointment today due to stomach virus. Will plan to follow up on next scheduled visit.     Brian Adame, PTA

## 2019-12-13 ENCOUNTER — HOSPITAL ENCOUNTER (OUTPATIENT)
Dept: PHYSICAL THERAPY | Age: 29
Discharge: HOME | End: 2019-12-13
Attending: FAMILY MEDICINE
Payer: SELF-PAY

## 2019-12-13 PROCEDURE — 97110 THERAPEUTIC EXERCISES: CPT

## 2019-12-13 PROCEDURE — 97140 MANUAL THERAPY 1/> REGIONS: CPT

## 2019-12-13 NOTE — PROGRESS NOTES
Wili Landing  : 1990  Payor: ABSOLUTE TOTAL CARE / [place] / Product Type: Managed Care Medicaid /  [address]  at [facility]  [address] \[facility]", [address]  [phone]   [phone]      OUTPATIENT PHYSICAL THERAPY: Daily Treatment Note 2019  Visit Count:  7    ICD-10: Treatment Diagnosis:   Cervicalgia (M54.2)     Muscle weakness (generalized) (M62.81) TREATMENT PLAN:  Effective Dates: 10/31/2019 TO 2020 (90 days).    Frequency/Duration: 2 times a week for 90 Days       PRE-TREATMENT SYMPTOMS/COMPLAINTS:  Pt reports her stomach stuff is better, but still having headaches. Pain rated at     MEDICATIONS REVIEWED:  2019   TREATMENT:   (In addition to Assessment/Re-Assessment sessions the following treatments were rendered)    THERAPEUTIC EXERCISE: (14 minutes):  Exercises per grid below to improve mobility, strength and balance. Required minimal visual and verbal cues to promote proper body alignment and promote proper body posture. Progressed resistance, range and complexity of movement as indicated.      Date:  2019 Date:  19 Date:  2019 Date:  2019 Date:  19   Activity/Exercise Parameters Parameters Parameters     Scifit 12min 12 minutes  NuStep with arms and legs  12 minutes  Level 3  NuStep with arms and legs 12 min   Level 3  With no arms   (holding baby)   Chin tucks 1x10 reps  Hold 10s  1x10 reps  Hold 10s  5 x 5 sec hold    Sleeping positions   Prone with pillows due to back pain at night       Resisted shoulder extension   1x20 reps  Green tB 1x20 reps  Green tB    Resisted scap 2-way   1x20 reps  Green tB 1x20 reps  Green tB    Resisted shoulder adduction   1x20 reps  Green tB 1x20 reps  Green tB              MANUAL THERAPY: (30  minutes): Joint mobilization, Soft tissue mobilization and Manipulation was utilized and necessary because of the patient's restricted joint motion, painful spasm, loss of articular motion and restricted motion of soft tissue. Patient supine for STM with hands to bilateral upper traps and cervical paraspinals. Gentle suboccipital traction and cervical ROM and stretches. (Used abbreviations: MET - muscle energy technique; PNF - proprioceptive neuromuscular facilitation; NMR - neuromuscular re-education; AP - anterior to posterior; PA - posterior to anterior)    MODALITIES: (10 minutes): *  Hot Pack Therapy in order to provide analgesia and relieve muscle spasm. Pt supine moist heat to neck to decrease pain and increase mobility. Skin was clear and intact. TREATMENT/SESSION ASSESSMENT:  Adalberto Nails with decreased cervical tension following manual therapy. Patient reports neck feels good, but back is hurting her. RECOMMENDATIONS/INTENT FOR NEXT TREATMENT SESSION: \"Next visit will focus on advancements to more challenging activities\".     PAIN: Initial: 4/10 Post Session:  2/10 neck and 4/10 in back still      [employer] Portal    Total Treatment Billable Duration: 44 min  PT Patient Time In/Time Out  Time In: 0936(Patient was 6 minutes late )  Time Out: [address], [facility]    Future Appointments   Date Time Provider Micki Anthoyn   1/3/2020  9:20 AM DO CARL Rasheed

## 2020-02-21 NOTE — THERAPY DISCHARGE
Faiza Morejon  : 1990  Payor: ABSOLUTE TOTAL CARE / [place] / Product Type: Managed Care Medicaid /  [address]  at [facility]  [address] [facility], [place] [address] [address] [facility]  [phone]   [phone]              OUTPATIENT PHYSICAL THERAPY:Discontinuation note 2020    ICD-10: Treatment Diagnosis:   Cervicalgia (M54.2)     Muscle weakness (generalized) (M62.81)    Precautions/Allergies:   Patient has no known allergies. Fall Risk Score: 0 (? 5 = High Risk)    MD Orders: Eval and Treat MEDICAL/REFERRING DIAGNOSIS:  Episodic tension-type headache, not intractable [G44.219]  Pain in right hip [M25.551]  Pain in left hip [M25.552]     DATE OF ONSET: 1 month ago    REFERRING PHYSICIAN: Dariel Sewell DO    [address]: TBD by patient      Ambulatory/Rehab Services H2 Model Falls Risk Assessment    Risk Factors:       No Risk Factors Identified Ability to Rise from Chair:       (0)  Ability to rise in a single movement    Falls Prevention Plan:       No modifications necessary   Total: (5 or greater = High Risk): 0    © [facility] [employer]. All Rights Reserved. [facility] Patent #[id number]. Federal Law prohibits the replication, distribution or use without written permission from [facility]: As of 2019, Patient Name has attended 7 out of 9 scheduled visits, with 2 cancellation(s) and 0 no shows. Faiza Morejon has been seen in physical therapy from 10/31/19 to 2019 for 7 visits. Treatment has been discontinued at this time due to patient failing to return for additional treatment. Below you will find details regarding goals that have met or not met prior to discontinuation. Some goals were not met due to failure to return to physical therpay. Thank you for this referral.        PROBLEM LIST (Impacting functional limitations):  1.  Decreased Strength  2. Decreased ADL/Functional Activities  3. Decreased Transfer Abilities  4. Increased Pain  5. Decreased Activity Tolerance  6. Increased Fatigue  7. Increased Shortness of Breath  8. Decreased Flexibility/Joint Mobility  9. Decreased [place] with Home Exercise Program INTERVENTIONS PLANNED:  1. Balance Exercise  2. Bed Mobility  3. Cold  4. Cryotherapy  5. Family Education  6. Gait Training  7. Heat  8. Home Exercise Program (HEP)  9. Manual Therapy  10. Neuromuscular Re-education/Strengthening  11. Range of Motion (ROM)  12. Therapeutic Activites  13. Therapeutic Exercise/Strengthening  14. Transfer Training  15. Mechanical Traction  16. Electrical Stimulation   TREATMENT PLAN:  Effective Dates: 10/31/2019 TO 1/29/2020 (90 days). Frequency/Duration: 2 times a week for 90 Days    GOALS: (Goals have been discussed and agreed upon with patient. )-Not met due to failure to return for further treatments  SHORT-TERM FUNCTIONAL GOALS: Time Frame: 4 weeks  1. Shania Henson will report <=5/10 pain to L cervical spine and headaches with performance of functional spinal mobility and rotation while driving. 2.  Shania Henson will demonstrate improved NDI score, indicating spinal improvement from 16/50 to 11/50 affecting minimal to no difficulty with performance of cervical mobility and strengthening. 3.  Shania Henson will improve ROM to >=90% to assist with improved function during instrumental activities of daily living. 4.  Shania Henson will improve MMT to >=4+/5 to all UE strengthening to return to PLOF and improve functional tolerance. DISCHARGE GOALS: Time Frame: 12 weeks  1. Shania Henson will report <=3/10 pain to L cervical spine and headaches with performance of functional spinal mobility and rotation while driving.   2. Shania Henson will demonstrate improved NDI score, indicating spinal improvement from 16/50 to 6/50 affecting minimal to no difficulty with performance of cervical mobility and strengthening. 3. Mead Letters to be independent with advanced HEP for cervical region and UE's. Rehabilitation Potential For Stated Goals: GOOD  Regarding Ita Fried's therapy, I certify that the treatment plan above will be carried out by a therapist or under their direction.   Thank you for this referral,  Jaci Osuna, PT, DPT

## 2020-03-09 ENCOUNTER — APPOINTMENT (OUTPATIENT)
Dept: PHYSICAL THERAPY | Age: 30
End: 2020-03-09
Attending: FAMILY MEDICINE

## 2020-10-05 ENCOUNTER — APPOINTMENT (RX ONLY)
Dept: URBAN - METROPOLITAN AREA CLINIC 349 | Facility: CLINIC | Age: 30
Setting detail: DERMATOLOGY
End: 2020-10-05

## 2020-10-05 DIAGNOSIS — Z87.2 PERSONAL HISTORY OF DISEASES OF THE SKIN AND SUBCUTANEOUS TISSUE: ICD-10-CM

## 2020-10-05 DIAGNOSIS — D22 MELANOCYTIC NEVI: ICD-10-CM

## 2020-10-05 DIAGNOSIS — Z71.89 OTHER SPECIFIED COUNSELING: ICD-10-CM

## 2020-10-05 DIAGNOSIS — L70.0 ACNE VULGARIS: ICD-10-CM

## 2020-10-05 PROBLEM — D22.61 MELANOCYTIC NEVI OF RIGHT UPPER LIMB, INCLUDING SHOULDER: Status: ACTIVE | Noted: 2020-10-05

## 2020-10-05 PROBLEM — D22.4 MELANOCYTIC NEVI OF SCALP AND NECK: Status: ACTIVE | Noted: 2020-10-05

## 2020-10-05 PROBLEM — D22.71 MELANOCYTIC NEVI OF RIGHT LOWER LIMB, INCLUDING HIP: Status: ACTIVE | Noted: 2020-10-05

## 2020-10-05 PROBLEM — D22.5 MELANOCYTIC NEVI OF TRUNK: Status: ACTIVE | Noted: 2020-10-05

## 2020-10-05 PROCEDURE — ? OBSERVATION

## 2020-10-05 PROCEDURE — 99202 OFFICE O/P NEW SF 15 MIN: CPT

## 2020-10-05 PROCEDURE — ? COUNSELING

## 2020-10-05 PROCEDURE — ? EDUCATIONAL RESOURCES PROVIDED

## 2020-10-05 PROCEDURE — ? OTHER

## 2020-10-05 PROCEDURE — ? TREATMENT REGIMEN

## 2020-10-05 ASSESSMENT — LOCATION SIMPLE DESCRIPTION DERM
LOCATION SIMPLE: LEFT CHEEK
LOCATION SIMPLE: UPPER BACK
LOCATION SIMPLE: CHEST
LOCATION SIMPLE: RIGHT POSTERIOR THIGH
LOCATION SIMPLE: ABDOMEN
LOCATION SIMPLE: RIGHT FOREARM
LOCATION SIMPLE: RIGHT FOREHEAD
LOCATION SIMPLE: RIGHT UPPER BACK
LOCATION SIMPLE: RIGHT PRETIBIAL REGION
LOCATION SIMPLE: RIGHT THIGH
LOCATION SIMPLE: RIGHT ANTERIOR NECK

## 2020-10-05 ASSESSMENT — LOCATION ZONE DERM
LOCATION ZONE: ARM
LOCATION ZONE: FACE
LOCATION ZONE: TRUNK
LOCATION ZONE: LEG
LOCATION ZONE: NECK

## 2020-10-05 ASSESSMENT — LOCATION DETAILED DESCRIPTION DERM
LOCATION DETAILED: RIGHT DISTAL POSTERIOR THIGH
LOCATION DETAILED: INFERIOR THORACIC SPINE
LOCATION DETAILED: RIGHT PROXIMAL PRETIBIAL REGION
LOCATION DETAILED: RIGHT INFERIOR LATERAL NECK
LOCATION DETAILED: RIGHT ANTERIOR PROXIMAL THIGH
LOCATION DETAILED: EPIGASTRIC SKIN
LOCATION DETAILED: RIGHT INFERIOR MEDIAL FOREHEAD
LOCATION DETAILED: RIGHT SUPERIOR MEDIAL UPPER BACK
LOCATION DETAILED: RIGHT VENTRAL PROXIMAL FOREARM
LOCATION DETAILED: LEFT INFERIOR CENTRAL MALAR CHEEK
LOCATION DETAILED: RIGHT SUPERIOR UPPER BACK
LOCATION DETAILED: LEFT MEDIAL SUPERIOR CHEST

## 2020-10-05 NOTE — HPI: SKIN LESION
How Severe Is Your Skin Lesion?: mild
Has Your Skin Lesion Been Treated?: not been treated
Is This A New Presentation, Or A Follow-Up?: Skin Lesion
Additional History: Patient is here for a skin check. Patient denies any lesions of concern and denies any growing, bleeding or changing. Patient denies family or personal history of melanoma. She has a history of a dysplastic nevus.

## 2020-10-05 NOTE — PROCEDURE: COUNSELING
Topical Retinoid counseling:  Patient advised to apply a pea-sized amount only at bedtime and wait 30 minutes after washing their face before applying.  If too drying, patient may add a non-comedogenic moisturizer. The patient verbalized understanding of the proper use and possible adverse effects of retinoids.  All of the patient's questions and concerns were addressed.
Tazorac Counseling:  Patient advised that medication is irritating and drying.  Patient may need to apply sparingly and wash off after an hour before eventually leaving it on overnight.  The patient verbalized understanding of the proper use and possible adverse effects of tazorac.  All of the patient's questions and concerns were addressed.
Benzoyl Peroxide Pregnancy And Lactation Text: This medication is Pregnancy Category C. It is unknown if benzoyl peroxide is excreted in breast milk.
Erythromycin Counseling:  I discussed with the patient the risks of erythromycin including but not limited to GI upset, allergic reaction, drug rash, diarrhea, increase in liver enzymes, and yeast infections.
Sarecycline Counseling: Patient advised regarding possible photosensitivity and discoloration of the teeth, skin, lips, tongue and gums.  Patient instructed to avoid sunlight, if possible.  When exposed to sunlight, patients should wear protective clothing, sunglasses, and sunscreen.  The patient was instructed to call the office immediately if the following severe adverse effects occur:  hearing changes, easy bruising/bleeding, severe headache, or vision changes.  The patient verbalized understanding of the proper use and possible adverse effects of sarecycline.  All of the patient's questions and concerns were addressed.
Topical Clindamycin Pregnancy And Lactation Text: This medication is Pregnancy Category B and is considered safe during pregnancy. It is unknown if it is excreted in breast milk.
Use Enhanced Medication Counseling?: No
Topical Sulfur Applications Counseling: Topical Sulfur Counseling: Patient counseled that this medication may cause skin irritation or allergic reactions.  In the event of skin irritation, the patient was advised to reduce the amount of the drug applied or use it less frequently.   The patient verbalized understanding of the proper use and possible adverse effects of topical sulfur application.  All of the patient's questions and concerns were addressed.
Dapsone Counseling: I discussed with the patient the risks of dapsone including but not limited to hemolytic anemia, agranulocytosis, rashes, methemoglobinemia, kidney failure, peripheral neuropathy, headaches, GI upset, and liver toxicity.  Patients who start dapsone require monitoring including baseline LFTs and weekly CBCs for the first month, then every month thereafter.  The patient verbalized understanding of the proper use and possible adverse effects of dapsone.  All of the patient's questions and concerns were addressed.
Bactrim Counseling:  I discussed with the patient the risks of sulfa antibiotics including but not limited to GI upset, allergic reaction, drug rash, diarrhea, dizziness, photosensitivity, and yeast infections.  Rarely, more serious reactions can occur including but not limited to aplastic anemia, agranulocytosis, methemoglobinemia, blood dyscrasias, liver or kidney failure, lung infiltrates or desquamative/blistering drug rashes.
High Dose Vitamin A Pregnancy And Lactation Text: High dose vitamin A therapy is contraindicated during pregnancy and breast feeding.
Detail Level: Generalized
Detail Level: Zone
Azithromycin Pregnancy And Lactation Text: This medication is considered safe during pregnancy and is also secreted in breast milk.
Tetracycline Pregnancy And Lactation Text: This medication is Pregnancy Category D and not consider safe during pregnancy. It is also excreted in breast milk.
Topical Clindamycin Counseling: Patient counseled that this medication may cause skin irritation or allergic reactions.  In the event of skin irritation, the patient was advised to reduce the amount of the drug applied or use it less frequently.   The patient verbalized understanding of the proper use and possible adverse effects of clindamycin.  All of the patient's questions and concerns were addressed.
Azithromycin Counseling:  I discussed with the patient the risks of azithromycin including but not limited to GI upset, allergic reaction, drug rash, diarrhea, and yeast infections.
Bactrim Pregnancy And Lactation Text: This medication is Pregnancy Category D and is known to cause fetal risk.  It is also excreted in breast milk.
Spironolactone Pregnancy And Lactation Text: This medication can cause feminization of the male fetus and should be avoided during pregnancy. The active metabolite is also found in breast milk.
Birth Control Pills Pregnancy And Lactation Text: This medication should be avoided if pregnant and for the first 30 days post-partum.
Tazorac Pregnancy And Lactation Text: This medication is not safe during pregnancy. It is unknown if this medication is excreted in breast milk.
Doxycycline Counseling:  Patient counseled regarding possible photosensitivity and increased risk for sunburn.  Patient instructed to avoid sunlight, if possible.  When exposed to sunlight, patients should wear protective clothing, sunglasses, and sunscreen.  The patient was instructed to call the office immediately if the following severe adverse effects occur:  hearing changes, easy bruising/bleeding, severe headache, or vision changes.  The patient verbalized understanding of the proper use and possible adverse effects of doxycycline.  All of the patient's questions and concerns were addressed.
Spironolactone Counseling: Patient advised regarding risks of diarrhea, abdominal pain, hyperkalemia, birth defects (for female patients), liver toxicity and renal toxicity. The patient may need blood work to monitor liver and kidney function and potassium levels while on therapy. The patient verbalized understanding of the proper use and possible adverse effects of spironolactone.  All of the patient's questions and concerns were addressed.
Isotretinoin Counseling: Patient should get monthly blood tests, not donate blood, not drive at night if vision affected, not share medication, and not undergo elective surgery for 6 months after tx completed. Side effects reviewed, pt to contact office should one occur.
Dapsone Pregnancy And Lactation Text: This medication is Pregnancy Category C and is not considered safe during pregnancy or breast feeding.
Isotretinoin Pregnancy And Lactation Text: This medication is Pregnancy Category X and is considered extremely dangerous during pregnancy. It is unknown if it is excreted in breast milk.
Doxycycline Pregnancy And Lactation Text: This medication is Pregnancy Category D and not consider safe during pregnancy. It is also excreted in breast milk but is considered safe for shorter treatment courses.
Tetracycline Counseling: Patient counseled regarding possible photosensitivity and increased risk for sunburn.  Patient instructed to avoid sunlight, if possible.  When exposed to sunlight, patients should wear protective clothing, sunglasses, and sunscreen.  The patient was instructed to call the office immediately if the following severe adverse effects occur:  hearing changes, easy bruising/bleeding, severe headache, or vision changes.  The patient verbalized understanding of the proper use and possible adverse effects of tetracycline.  All of the patient's questions and concerns were addressed. Patient understands to avoid pregnancy while on therapy due to potential birth defects.
Erythromycin Pregnancy And Lactation Text: This medication is Pregnancy Category B and is considered safe during pregnancy. It is also excreted in breast milk.
Topical Retinoid Pregnancy And Lactation Text: This medication is Pregnancy Category C. It is unknown if this medication is excreted in breast milk.
Birth Control Pills Counseling: Birth Control Pill Counseling: I discussed with the patient the potential side effects of OCPs including but not limited to increased risk of stroke, heart attack, thrombophlebitis, deep venous thrombosis, hepatic adenomas, breast changes, GI upset, headaches, and depression.  The patient verbalized understanding of the proper use and possible adverse effects of OCPs. All of the patient's questions and concerns were addressed.
Minocycline Counseling: Patient advised regarding possible photosensitivity and discoloration of the teeth, skin, lips, tongue and gums.  Patient instructed to avoid sunlight, if possible.  When exposed to sunlight, patients should wear protective clothing, sunglasses, and sunscreen.  The patient was instructed to call the office immediately if the following severe adverse effects occur:  hearing changes, easy bruising/bleeding, severe headache, or vision changes.  The patient verbalized understanding of the proper use and possible adverse effects of minocycline.  All of the patient's questions and concerns were addressed.
Benzoyl Peroxide Counseling: Patient counseled that medicine may cause skin irritation and bleach clothing.  In the event of skin irritation, the patient was advised to reduce the amount of the drug applied or use it less frequently.   The patient verbalized understanding of the proper use and possible adverse effects of benzoyl peroxide.  All of the patient's questions and concerns were addressed.
Topical Sulfur Applications Pregnancy And Lactation Text: This medication is Pregnancy Category C and has an unknown safety profile during pregnancy. It is unknown if this topical medication is excreted in breast milk.
High Dose Vitamin A Counseling: Side effects reviewed, pt to contact office should one occur.

## 2020-10-05 NOTE — PROCEDURE: OTHER
Note Text (......Xxx Chief Complaint.): This diagnosis correlates with the
Detail Level: Zone
Other (Free Text): Patient is breast feeding. Recommended washing with benzapro or panoxyl. Patient thinks she will be done breastfeeding at her follow up so we will discuss more in detail then.

## 2021-01-06 NOTE — PROGRESS NOTES
Edge placed as ordered  Unable to trace FHR while pt  On right tilt. Repositioned to left tilt FHR tracing. (3) age 75 years or over

## 2022-02-22 PROBLEM — O09.291 HISTORY OF GESTATIONAL DIABETES IN PRIOR PREGNANCY, CURRENTLY PREGNANT IN FIRST TRIMESTER: Status: ACTIVE | Noted: 2022-02-22

## 2022-02-22 PROBLEM — O34.219 PREVIOUS CESAREAN SECTION COMPLICATING PREGNANCY: Status: ACTIVE | Noted: 2022-02-22

## 2022-02-22 PROBLEM — O99.211 OBESITY AFFECTING PREGNANCY IN FIRST TRIMESTER: Status: ACTIVE | Noted: 2019-10-14

## 2022-02-22 PROBLEM — G44.219 EPISODIC TENSION-TYPE HEADACHE, NOT INTRACTABLE: Status: RESOLVED | Noted: 2019-10-14 | Resolved: 2022-02-22

## 2022-02-22 PROBLEM — O34.219 HX SUCCESSFUL VBAC (VAGINAL BIRTH AFTER CESAREAN), CURRENTLY PREGNANT: Status: ACTIVE | Noted: 2022-02-22

## 2022-02-22 PROBLEM — M25.552 BILATERAL HIP PAIN: Status: RESOLVED | Noted: 2019-10-14 | Resolved: 2022-02-22

## 2022-02-22 PROBLEM — M25.551 BILATERAL HIP PAIN: Status: RESOLVED | Noted: 2019-10-14 | Resolved: 2022-02-22

## 2022-02-22 PROBLEM — Z86.32 HISTORY OF GESTATIONAL DIABETES IN PRIOR PREGNANCY, CURRENTLY PREGNANT IN FIRST TRIMESTER: Status: ACTIVE | Noted: 2022-02-22

## 2022-02-22 PROBLEM — Z87.59 HISTORY OF PLACENTA ABRUPTION: Status: ACTIVE | Noted: 2022-02-22

## 2022-02-22 PROBLEM — B37.2 CANDIDIASIS, INTERTRIGO: Status: RESOLVED | Noted: 2019-10-14 | Resolved: 2022-02-22

## 2022-02-23 PROBLEM — O09.41 HIGH RISK MULTIGRAVIDA IN FIRST TRIMESTER: Status: ACTIVE | Noted: 2022-02-23

## 2022-03-03 ENCOUNTER — APPOINTMENT (RX ONLY)
Dept: URBAN - METROPOLITAN AREA CLINIC 329 | Facility: CLINIC | Age: 32
Setting detail: DERMATOLOGY
End: 2022-03-03

## 2022-03-03 DIAGNOSIS — Z71.89 OTHER SPECIFIED COUNSELING: ICD-10-CM

## 2022-03-03 DIAGNOSIS — Z87.2 PERSONAL HISTORY OF DISEASES OF THE SKIN AND SUBCUTANEOUS TISSUE: ICD-10-CM

## 2022-03-03 DIAGNOSIS — L72.0 EPIDERMAL CYST: ICD-10-CM

## 2022-03-03 DIAGNOSIS — D22 MELANOCYTIC NEVI: ICD-10-CM | Status: STABLE

## 2022-03-03 DIAGNOSIS — L81.4 OTHER MELANIN HYPERPIGMENTATION: ICD-10-CM

## 2022-03-03 PROBLEM — D23.5 OTHER BENIGN NEOPLASM OF SKIN OF TRUNK: Status: ACTIVE | Noted: 2022-03-03

## 2022-03-03 PROBLEM — D22.5 MELANOCYTIC NEVI OF TRUNK: Status: ACTIVE | Noted: 2022-03-03

## 2022-03-03 PROCEDURE — ? COUNSELING

## 2022-03-03 PROCEDURE — ? ADDITIONAL NOTES

## 2022-03-03 PROCEDURE — ? FULL BODY SKIN EXAM

## 2022-03-03 PROCEDURE — 99213 OFFICE O/P EST LOW 20 MIN: CPT

## 2022-03-03 PROCEDURE — ? OTHER

## 2022-03-03 PROCEDURE — ? MEDICAL PHOTOGRAPHY REVIEW

## 2022-03-03 PROCEDURE — ? TREATMENT REGIMEN

## 2022-03-03 ASSESSMENT — LOCATION DETAILED DESCRIPTION DERM
LOCATION DETAILED: RIGHT SUPERIOR UPPER BACK
LOCATION DETAILED: LEFT INFERIOR FOREHEAD
LOCATION DETAILED: RIGHT MEDIAL UPPER BACK
LOCATION DETAILED: INFERIOR THORACIC SPINE

## 2022-03-03 ASSESSMENT — LOCATION SIMPLE DESCRIPTION DERM
LOCATION SIMPLE: UPPER BACK
LOCATION SIMPLE: RIGHT UPPER BACK
LOCATION SIMPLE: LEFT FOREHEAD

## 2022-03-03 ASSESSMENT — LOCATION ZONE DERM
LOCATION ZONE: FACE
LOCATION ZONE: TRUNK

## 2022-03-03 NOTE — HPI: SKIN LESION
How Severe Is Your Skin Lesion?: mild
Is This A New Presentation, Or A Follow-Up?: Skin Lesion
Additional History: Patient stated she has a lesion in her groin area that her OB/GYN is concerned about. She stated that she told her it appeared asymmetric and was concerning to her. Patient stated lesion has been present for several years.

## 2022-03-03 NOTE — PROCEDURE: TREATMENT REGIMEN
PT REQUESTING RESULT OF XRAY RT FOOT SHE HAD DONE AT [address] [place] [address] ON 1/18/18
Continue Regimen: SPF 30+ broad spectrum every 3-4 hours when outside.
Detail Level: Generalized

## 2022-03-03 NOTE — PROCEDURE: MIPS QUALITY
Detail Level: Zone
Quality 226: Preventive Care And Screening: Tobacco Use: Screening And Cessation Intervention: Patient screened for tobacco use and is an ex/non-smoker
Quality 110: Preventive Care And Screening: Influenza Immunization: Influenza Immunization previously received during influenza season
Quality 431: Preventive Care And Screening: Unhealthy Alcohol Use - Screening: Patient not identified as an unhealthy alcohol user when screened for unhealthy alcohol use using a systematic screening method

## 2022-03-03 NOTE — PROCEDURE: OTHER
Other (Free Text): The lesion patient’s ob was concerned about is benign. There was of photo of that lesion that Radha had put an observation on back in 2020. Lesion was stable compared to last visit.
Detail Level: Zone
Render Risk Assessment In Note?: no
Note Text (......Xxx Chief Complaint.): This diagnosis correlates with the
Other (Free Text): Mentioned we could treat lesions with hyfrecator. Patient asked about a face wash. Advised patient to wait until after pregnancy to start any products.

## 2022-03-19 PROBLEM — O34.219 HX SUCCESSFUL VBAC (VAGINAL BIRTH AFTER CESAREAN), CURRENTLY PREGNANT: Status: ACTIVE | Noted: 2022-02-22

## 2022-03-19 PROBLEM — O99.211 OBESITY AFFECTING PREGNANCY IN FIRST TRIMESTER: Status: ACTIVE | Noted: 2019-10-14

## 2022-03-19 PROBLEM — Z87.59 HISTORY OF PLACENTA ABRUPTION: Status: ACTIVE | Noted: 2022-02-22

## 2022-03-19 PROBLEM — O09.41 HIGH RISK MULTIGRAVIDA IN FIRST TRIMESTER: Status: ACTIVE | Noted: 2022-02-23

## 2022-03-20 PROBLEM — O09.291 HISTORY OF GESTATIONAL DIABETES IN PRIOR PREGNANCY, CURRENTLY PREGNANT IN FIRST TRIMESTER: Status: ACTIVE | Noted: 2022-02-22

## 2022-03-20 PROBLEM — Z86.32 HISTORY OF GESTATIONAL DIABETES IN PRIOR PREGNANCY, CURRENTLY PREGNANT IN FIRST TRIMESTER: Status: ACTIVE | Noted: 2022-02-22

## 2022-04-13 PROBLEM — Z86.32 HISTORY OF GESTATIONAL DIABETES IN PRIOR PREGNANCY, CURRENTLY PREGNANT, SECOND TRIMESTER: Status: ACTIVE | Noted: 2022-02-22

## 2022-04-13 PROBLEM — O09.42 HIGH RISK MULTIGRAVIDA IN SECOND TRIMESTER: Status: ACTIVE | Noted: 2022-02-23

## 2022-04-13 PROBLEM — O99.212 OBESITY AFFECTING PREGNANCY IN SECOND TRIMESTER: Status: ACTIVE | Noted: 2019-10-14

## 2022-04-13 PROBLEM — O09.292 HISTORY OF GESTATIONAL DIABETES IN PRIOR PREGNANCY, CURRENTLY PREGNANT, SECOND TRIMESTER: Status: ACTIVE | Noted: 2022-02-22

## 2022-04-25 ENCOUNTER — HOSPITAL ENCOUNTER (EMERGENCY)
Age: 32
Discharge: HOME | End: 2022-04-25
Attending: OBSTETRICS & GYNECOLOGY | Admitting: OBSTETRICS & GYNECOLOGY
Payer: COMMERCIAL

## 2022-04-25 VITALS
HEART RATE: 90 BPM | SYSTOLIC BLOOD PRESSURE: 120 MMHG | OXYGEN SATURATION: 98 % | RESPIRATION RATE: 20 BRPM | DIASTOLIC BLOOD PRESSURE: 66 MMHG | WEIGHT: 264 LBS | BODY MASS INDEX: 45.07 KG/M2 | HEIGHT: 64 IN | TEMPERATURE: 98.1 F

## 2022-04-25 PROBLEM — O26.893 SHORTNESS OF BREATH DUE TO PREGNANCY IN THIRD TRIMESTER: Status: ACTIVE | Noted: 2022-04-25

## 2022-04-25 PROBLEM — R06.02 SHORTNESS OF BREATH DUE TO PREGNANCY IN THIRD TRIMESTER: Status: ACTIVE | Noted: 2022-04-25

## 2022-04-25 LAB
GLUCOSE BLD STRIP.AUTO-MCNC: 133 MG/DL (ref 65–100)
SERVICE CMNT-IMP: ABNORMAL

## 2022-04-25 PROCEDURE — 99283 EMERGENCY DEPT VISIT LOW MDM: CPT

## 2022-04-25 PROCEDURE — 82962 GLUCOSE BLOOD TEST: CPT

## 2022-04-25 PROCEDURE — 74011250637 HC RX REV CODE- 250/637: Performed by: OBSTETRICS & GYNECOLOGY

## 2022-04-25 RX ORDER — CETIRIZINE HCL 10 MG
10 TABLET ORAL
Status: COMPLETED | OUTPATIENT
Start: 2022-04-25 | End: 2022-04-25

## 2022-04-25 RX ADMIN — CETIRIZINE HYDROCHLORIDE 10 MG: 10 TABLET, FILM COATED ORAL at 19:12

## 2022-04-25 NOTE — DISCHARGE INSTRUCTIONS
Weeks 26 to 30 of Your Pregnancy: Care Instructions  Overview     You are now entering your last trimester of pregnancy. Your baby is growing quickly. Ceola Lung probably feel your baby moving around more often. Your doctor may ask you to count your baby's kicks. Your back may ache as your body gets used to your baby's size and length. If you haven't already had the Tdap shot during this pregnancy, talk to your doctor about getting it. It will help protect your  against pertussis infection. During this time, it's important to take care of yourself and pay attention to what your body needs. If you feel sexual, you can explore ways to be close with your partner that match your comfort and desire. Follow-up care is a key part of your treatment and safety. Be sure to make and go to all appointments, and call your doctor if you are having problems. It's also a good idea to know your test results and keep a list of the medicines you take. How can you care for yourself at home? Take it easy at work  Take frequent breaks. If possible, stop working when you are tired, and rest during your lunch hour. Take bathroom breaks every 2 hours. Change positions often. If you sit for long periods, stand up and walk around. When you stand for a long time, keep one foot on a low stool with your knee bent. After standing a lot, sit with your feet up. Avoid fumes, chemicals, and tobacco smoke. Be sexual in your own way  Having sex during pregnancy is okay, unless your doctor tells you not to. You may be very interested in sex, or you may have no interest at all. Your growing belly can make it hard to find a good position during intercourse. [place] and explore. You may get cramps in your uterus when your partner touches your breasts. A back rub may relieve the backache or cramps that sometimes follow orgasm. Learn about  labor  Watch for signs of  labor.  You may be going into labor if:  You have menstrual-like cramps, with or without nausea. You have about 6 or more contractions in 1 hour, even after you have had a glass of water and are resting. You have a low, dull backache that does not go away when you change your position. You have pain or pressure in your pelvis that comes and goes in a pattern. You have intestinal cramping or flu-like symptoms, with or without diarrhea. You notice an increase or change in your vaginal discharge. Discharge may be heavy, mucus-like, watery, or streaked with blood. Your water breaks. If you think you have  labor:  Drink 2 or 3 glasses of water or juice. Not drinking enough fluids can cause contractions. Stop what you are doing, and empty your bladder. Then lie down on your left side for at least 1 hour. While lying on your side, find your breast bone. Put your fingers in the soft spot just below it. Move your fingers down toward your belly button to find the top of your uterus. Check to see if it is tight. Contractions can be weak or strong. Record your contractions for an hour. Time a contraction from the start of one contraction to the start of the next one. Single or several strong contractions without a pattern are called Love-Hernandez contractions. They are practice contractions but not the start of labor. They often stop if you change what you are doing. Call your doctor if you have regular contractions. Where can you learn more? Go to http://www.gray.com/  Enter T622 in the search box to learn more about \"Weeks 26 to 30 of Your Pregnancy: Care Instructions. \"  Current as of: 2021               Content Version: 13.2  © 8738-5109 [employer]. Care instructions adapted under license by [employer] (which disclaims liability or warranty for this information).  If you have questions about a medical condition or this instruction, always ask your healthcare professional. [employer] disclaims any warranty or liability for your use of this information. Weeks 18 to 22 of Your Pregnancy: Care Instructions  Overview     Your baby is continuing to develop quickly. Sometime between 18 and 22 weeks, you'll probably start to feel your baby move. At first, these small fetal movements feel like fluttering or \"butterflies. \" Or they may feel like gas bubbles. As your baby grows, these movements will become stronger. You may also notice that your baby hiccups. Babies at this stage can now suck their thumbs. You may find that your nausea and fatigue are gone. You may feel better overall and have more energy than you did in your first trimester. But you might now also have some new discomforts, like sleep problems or leg cramps. Talk to your doctor about things you can do at home to ease these problems. Follow-up care is a key part of your treatment and safety. Be sure to make and go to all appointments, and call your doctor if you are having problems. It's also a good idea to know your test results and keep a list of the medicines you take. How can you care for yourself at home? Ease sleep problems  · Avoid caffeine in drinks or chocolate late in the day. · Get some exercise every day. · Take a warm shower or bath before bed. · Have a light snack or glass of milk at bedtime. · Do relaxation exercises in bed to calm your mind and body. · Support your legs and back with extra pillows. Try a pillow between your legs if you sleep on your side. · Do not use sleeping pills or alcohol. They could harm your baby. Ease leg cramps  · Do not massage your calf during the cramp. · Sit on a firm bed or chair. Straighten your leg, and bend your foot (flex your ankle) slowly upward, toward your knee. Bend your toes up and down. · Stand on a cool, flat surface. Stretch your toes upward, and take small steps walking on your heels.   · Use a heating pad or hot water bottle to help with muscle ache. Prevent leg cramps  · Be sure to get enough calcium. If you are worried that you are not getting enough, talk to your doctor. · Exercise every day, and stretch your legs before bed. · Take a warm bath before bed, and try leg warmers at night. Where can you learn more? Go to http://www.gray.com/  Enter D183 in the search box to learn more about \"Weeks 18 to 22 of Your Pregnancy: Care Instructions. \"  Current as of: June 16, 2021               Content Version: 13.2  © 7820-0710 [employer]. Care instructions adapted under license by [employer] (which disclaims liability or warranty for this information). If you have questions about a medical condition or this instruction, always ask your healthcare professional. [address] any warranty or liability for your use of this information.

## 2022-04-25 NOTE — H&P
History & Physical    Name: Noelle Alejandre MRN: [id number]  SSN: [id number]    YOB: 1990  Age: 27 y.o. Sex: female        Subjective:     Estimated Date of Delivery: 22  OB History    Para Term  AB Living   8 6 6   1 6   SAB IAB Ectopic Molar Multiple Live Births   1       0 6      # Outcome Date GA Lbr Germán/2nd Weight Sex Delivery Anes PTL Lv   8 Current            7 SAB            6 Term 19 39w0d / 00:12 3.09 kg F  EPI N ANIVAL      Complications: Abruptio Placenta   5 Term 18 41w0d  3.997 kg M  EPI  ANIVAL      Birth Comments: induced   4 Term 16 41w0d  3.629 kg F  EPI  ANIVAL      Birth Comments: induced   3 Term 08/15/14 40w0d  3.544 kg M  EPI N ANIVAL      Birth Comments: epidural did not work   2 Term 13 39w0d  3.459 kg M  EPI N ANIVAL   1 Term 11 41w0d  3.175 kg M CS-Unspec Spinal N ANIVAL      Birth Comments: fetal distress       Ms. Marycarmen Jason is presenting with pregnancy at 22w0d to be evaluated for shortness of breath and fatigue. Patient states that she visited a [facility] today where items area thrown into bins as they are donated. She states that she walked around for about 30 minutes after which she felt tired and had shortness of breath. She denies any chest pain, leg swelling, vaginal bleeding or abdominal pain. She denies any leg swelling or recent travel. She states that she drinks lots of water. She reports active fetal movement. She reports a history of issues with mold. She denies a history of asthma as an adult but reports a history suggestive of reactive airway disease as a child. Prenatal course was complicated by obesity. Please see prenatal records for details.     Past Medical History:   Diagnosis Date    Anemia     in pregnancy    Bilateral hip pain 10/14/2019    Candidiasis, intertrigo 10/14/2019    Diabetes ([facility] [address]     Episodic tension-type headache, not intractable 10/14/2019     Past Surgical History: Procedure Laterality Date    APPENDECTOMY,W OTHR C      HX APPENDECTOMY      HX  SECTION      x1    HX TONSILLECTOMY       Social History     Occupational History    Not on file   Tobacco Use    Smoking status: Never Smoker    Smokeless tobacco: Never Used   Substance and Sexual Activity    Alcohol use: No    Drug use: No    Sexual activity: Yes     Partners: Male     Birth control/protection: None     Family History   Problem Relation Age of Onset    Heart Attack Mother 28    Stroke Mother     Other Mother         factor V + with PFO, tobacco use    Diabetes Neg Hx        No Known Allergies  Prior to Admission medications    Medication Sig Start Date End Date Taking? Authorizing Provider   PNV XC.60/SOCTSVI fum/folic ac (PRENATAL PO) Take  by mouth. Yes Provider, Historical   aspirin 81 mg chewable tablet Take 2 Tablets by mouth daily. 22  Yes Damaris Hendrix MD   acetaminophen (TYLENOL EXTRA STRENGTH) 500 mg tablet Take  by mouth every six (6) hours as needed for Pain. Yes Provider, Historical        Review of Systems: A comprehensive review of systems was negative except for that written in the HPI. Objective:     Vitals:  Vitals:    22 1821   BP: 120/66   Pulse: 90   Resp: 20   Temp: 98.1 °F (36.7 °C)   SpO2: 98%   Weight: 119.7 kg (264 lb)   Height: 5' 4\" (1.626 m)        Physical Exam:  Patient without distress.   Heart: Regular rate and rhythm  Lung: clear to auscultation throughout lung fields, no wheezes, no rales, no rhonchi and normal respiratory effort  Fundus: soft and non tender  Membranes:  Intact  Fetal Heart Rate: 145  Extremities: no edema, non-tender, no chords    Prenatal Labs:   Lab Results   Component Value Date/Time    ABO/Rh(D) A POSITIVE 2019 07:58 AM    Gonorrhea, External Negative 2019 12:00 AM    Chlamydia, External Negtaive 2019 12:00 AM         Assessment/Plan:     Active Problems:    Shortness of breath due to pregnancy in third trimester (4/25/2022)       Suspect reaction to [facility]en [facility] Zyrtec administered prior to discharge.  Return for worsening symptoms  Plan:

## 2022-04-25 NOTE — PROGRESS NOTES
Pt presents to [facility] ED with c/o Dizzy episode, sweating, shortness of breath. Triage assessment as documented. Dr. Yadira Caballero aware.

## 2022-05-18 VITALS — BODY MASS INDEX: 46.35 KG/M2 | DIASTOLIC BLOOD PRESSURE: 80 MMHG | SYSTOLIC BLOOD PRESSURE: 122 MMHG | WEIGHT: 270 LBS

## 2022-06-08 ENCOUNTER — ROUTINE PRENATAL (OUTPATIENT)
Dept: OBGYN CLINIC | Age: 32
End: 2022-06-08

## 2022-06-08 VITALS — DIASTOLIC BLOOD PRESSURE: 80 MMHG | WEIGHT: 275 LBS | BODY MASS INDEX: 47.2 KG/M2 | SYSTOLIC BLOOD PRESSURE: 122 MMHG

## 2022-06-08 DIAGNOSIS — O99.213 OBESITY AFFECTING PREGNANCY IN THIRD TRIMESTER: ICD-10-CM

## 2022-06-08 DIAGNOSIS — Z13.0 SCREENING, ANEMIA, DEFICIENCY, IRON: ICD-10-CM

## 2022-06-08 DIAGNOSIS — O09.43 HIGH RISK MULTIGRAVIDA IN THIRD TRIMESTER: Primary | ICD-10-CM

## 2022-06-08 DIAGNOSIS — Z13.1 SCREENING FOR DIABETES MELLITUS: ICD-10-CM

## 2022-06-08 DIAGNOSIS — Z86.32 HISTORY OF GESTATIONAL DIABETES IN PRIOR PREGNANCY, CURRENTLY PREGNANT IN THIRD TRIMESTER: ICD-10-CM

## 2022-06-08 DIAGNOSIS — Z87.59 HISTORY OF PLACENTA ABRUPTION: ICD-10-CM

## 2022-06-08 DIAGNOSIS — O09.43 HIGH RISK MULTIGRAVIDA IN THIRD TRIMESTER: ICD-10-CM

## 2022-06-08 DIAGNOSIS — O09.293 HISTORY OF GESTATIONAL DIABETES IN PRIOR PREGNANCY, CURRENTLY PREGNANT IN THIRD TRIMESTER: ICD-10-CM

## 2022-06-08 PROCEDURE — 99902 PR PRENATAL VISIT: CPT | Performed by: OBSTETRICS & GYNECOLOGY

## 2022-06-08 NOTE — PROGRESS NOTES
34yo [id number] for WENDIE w/ 2hr GTT:    BMI 47 (pregravid 43.92), TWG 19 lbs. Counseled. +FM, no VB, ctx or LOF. RC discussed. RTC 2wks. Patient Active Problem List    Diagnosis Date Noted    Anemia affecting third pregnancy 06/10/2022     Priority: Medium     22 Hgb 10.4  - Fe started. Check CBC in 4 weeks with TIBC and serum ferritin. ? If Hgb<9.5 or low iron stores, refer to hematology for consult and iron infusions. ? CBC 33-34 wks ___________________       [employer] High risk multigravida in third trimester 2022 at [facility]: Normal NT 1.7mm, nasal bone seen, genetic counseling done by MD;   4/15/2022 at [facility]: Normal anatomy and echo; AC 87%, CHINO 19cm, CL 5.1cm LOW RISK NIPT  06/10/22 at [facility]: Appropriate fetal growth; Normal repeat echo; AC 72%, Overall 64%, CHINO 20 cm, Dopplers WNL  - 2022 31w3d Appropriate growth, 69% overall, symmetric. AF=20.            Hx successful  (vaginal birth after ), currently pregnant 2022     - CS w first.   x 5 since      History of placenta abruption 2022 at [facility]: With G6 in 2019.         Previous gestational diabetes mellitus, antepartum, third trimester 2022 at [facility]: Pt's baseline a1c 5.7Passed early 2 hr GTT  06/10/22 at [facility]: Passed 2 hr GTT        Obesity affecting pregnancy in third trimester 10/14/2019

## 2022-06-09 PROBLEM — Z86.32 HISTORY OF GESTATIONAL DIABETES IN PRIOR PREGNANCY, CURRENTLY PREGNANT, SECOND TRIMESTER: Status: ACTIVE | Noted: 2022-02-22

## 2022-06-09 PROBLEM — O26.893 SHORTNESS OF BREATH DUE TO PREGNANCY IN THIRD TRIMESTER: Status: ACTIVE | Noted: 2022-04-25

## 2022-06-09 PROBLEM — O99.212 OBESITY AFFECTING PREGNANCY IN SECOND TRIMESTER: Status: ACTIVE | Noted: 2019-10-14

## 2022-06-09 PROBLEM — R06.02 SHORTNESS OF BREATH DUE TO PREGNANCY IN THIRD TRIMESTER: Status: ACTIVE | Noted: 2022-04-25

## 2022-06-09 PROBLEM — O09.42 HIGH RISK MULTIGRAVIDA IN SECOND TRIMESTER: Status: ACTIVE | Noted: 2022-02-23

## 2022-06-09 PROBLEM — O09.292 HISTORY OF GESTATIONAL DIABETES IN PRIOR PREGNANCY, CURRENTLY PREGNANT, SECOND TRIMESTER: Status: ACTIVE | Noted: 2022-02-22

## 2022-06-09 PROBLEM — Z87.59 HISTORY OF PLACENTA ABRUPTION: Status: ACTIVE | Noted: 2022-02-22

## 2022-06-09 PROBLEM — O34.219 HX SUCCESSFUL VBAC (VAGINAL BIRTH AFTER CESAREAN), CURRENTLY PREGNANT: Status: ACTIVE | Noted: 2022-02-22

## 2022-06-09 LAB
BASOPHILS # BLD: 0 K/UL (ref 0–0.2)
BASOPHILS NFR BLD: 0 % (ref 0–2)
DIFFERENTIAL METHOD BLD: ABNORMAL
EOSINOPHIL # BLD: 0.2 K/UL (ref 0–0.8)
EOSINOPHIL NFR BLD: 3 % (ref 0.5–7.8)
ERYTHROCYTE [DISTWIDTH] IN BLOOD BY AUTOMATED COUNT: 15.9 % (ref 11.9–14.6)
GLUCOSE 1 HOUR: 138 MG/DL
GLUCOSE P FAST SERPL-MCNC: 78 MG/DL
GLUCOSE TOLERANCE TEST 2 HOUR: 134 MG/DL
HCT VFR BLD AUTO: 33.1 % (ref 35.8–46.3)
HGB BLD-MCNC: 10.4 G/DL (ref 11.7–15.4)
IMM GRANULOCYTES # BLD AUTO: 0 K/UL (ref 0–0.5)
IMM GRANULOCYTES NFR BLD AUTO: 1 % (ref 0–5)
LYMPHOCYTES # BLD: 1.8 K/UL (ref 0.5–4.6)
LYMPHOCYTES NFR BLD: 27 % (ref 13–44)
MCH RBC QN AUTO: 25.6 PG (ref 26.1–32.9)
MCHC RBC AUTO-ENTMCNC: 31.4 G/DL (ref 31.4–35)
MCV RBC AUTO: 81.5 FL (ref 79.6–97.8)
MONOCYTES # BLD: 0.4 K/UL (ref 0.1–1.3)
MONOCYTES NFR BLD: 6 % (ref 4–12)
NEUTS SEG # BLD: 4.1 K/UL (ref 1.7–8.2)
NEUTS SEG NFR BLD: 63 % (ref 43–78)
NRBC # BLD: 0 K/UL (ref 0–0.2)
PLATELET # BLD AUTO: 268 K/UL (ref 150–450)
PMV BLD AUTO: 12.2 FL (ref 9.4–12.3)
RBC # BLD AUTO: 4.06 M/UL (ref 4.05–5.2)
WBC # BLD AUTO: 6.5 K/UL (ref 4.3–11.1)

## 2022-06-10 ENCOUNTER — ROUTINE PRENATAL (OUTPATIENT)
Dept: OBGYN CLINIC | Age: 32
End: 2022-06-10
Payer: COMMERCIAL

## 2022-06-10 VITALS — SYSTOLIC BLOOD PRESSURE: 108 MMHG | DIASTOLIC BLOOD PRESSURE: 68 MMHG | HEART RATE: 77 BPM

## 2022-06-10 DIAGNOSIS — O09.293 PREVIOUS GESTATIONAL DIABETES MELLITUS, ANTEPARTUM, THIRD TRIMESTER: Primary | ICD-10-CM

## 2022-06-10 DIAGNOSIS — R06.02 SHORTNESS OF BREATH DUE TO PREGNANCY IN THIRD TRIMESTER: ICD-10-CM

## 2022-06-10 DIAGNOSIS — O34.219 HX SUCCESSFUL VBAC (VAGINAL BIRTH AFTER CESAREAN), CURRENTLY PREGNANT: ICD-10-CM

## 2022-06-10 DIAGNOSIS — Z3A.28 28 WEEKS GESTATION OF PREGNANCY: ICD-10-CM

## 2022-06-10 DIAGNOSIS — O99.019 ANEMIA AFFECTING THIRD PREGNANCY: ICD-10-CM

## 2022-06-10 DIAGNOSIS — O99.213 OBESITY AFFECTING PREGNANCY IN THIRD TRIMESTER: ICD-10-CM

## 2022-06-10 DIAGNOSIS — Z87.59 HISTORY OF PLACENTA ABRUPTION: ICD-10-CM

## 2022-06-10 DIAGNOSIS — O26.893 SHORTNESS OF BREATH DUE TO PREGNANCY IN THIRD TRIMESTER: ICD-10-CM

## 2022-06-10 DIAGNOSIS — O09.43 HIGH RISK MULTIGRAVIDA IN THIRD TRIMESTER: ICD-10-CM

## 2022-06-10 DIAGNOSIS — Z86.32 PREVIOUS GESTATIONAL DIABETES MELLITUS, ANTEPARTUM, THIRD TRIMESTER: Primary | ICD-10-CM

## 2022-06-10 PROCEDURE — 76826 ECHO EXAM OF FETAL HEART: CPT | Performed by: OBSTETRICS & GYNECOLOGY

## 2022-06-10 PROCEDURE — 99215 OFFICE O/P EST HI 40 MIN: CPT | Performed by: OBSTETRICS & GYNECOLOGY

## 2022-06-10 PROCEDURE — 76816 OB US FOLLOW-UP PER FETUS: CPT | Performed by: OBSTETRICS & GYNECOLOGY

## 2022-06-10 ASSESSMENT — PATIENT HEALTH QUESTIONNAIRE - PHQ9
SUM OF ALL RESPONSES TO PHQ QUESTIONS 1-9: 0
2. FEELING DOWN, DEPRESSED OR HOPELESS: 0
SUM OF ALL RESPONSES TO PHQ QUESTIONS 1-9: 0
SUM OF ALL RESPONSES TO PHQ QUESTIONS 1-9: 0
SUM OF ALL RESPONSES TO PHQ9 QUESTIONS 1 & 2: 0
1. LITTLE INTEREST OR PLEASURE IN DOING THINGS: 0
SUM OF ALL RESPONSES TO PHQ QUESTIONS 1-9: 0

## 2022-06-10 NOTE — PROGRESS NOTES
MFM Consultation    Yesi Salazar (: 1990) is a 27 y.o. G7K3510 at Unknown with Not found. Araseli Gastelum    Presents for evaluation of the following chief complaint(s):  Ultrasound (growth) and High Risk Pregnancy (Obesity, H/O GDM, H/O , Hx Placental abruption )     Previous [facility] pt from 2019 seen for GDM, Obesity.      Patient is a [facility].  Scheduled to see Primary OB [facility]) on 22.       Reports HAs, h/o of migraines. No edema.  No bleeding, LOF, cramping, ctxs, or vaginal pressure. Reports good fetal movement. Interval history reviewed. Review of Systems - per HPI; otherwise unremarkable. Exam:     Vitals:    06/10/22 0856   BP: 108/68   Pulse: 77       Ultrasound: Please see formal ultrasound report under imaging tab. ASSESSMENT/PLAN:  Patient Active Problem List    Diagnosis Date Noted    High risk multigravida in third trimester 2022     Priority: High     2022 at [facility]: Normal NT 1.7mm, nasal bone seen, genetic counseling done by MD;   4/15/2022 at [facility]: Normal anatomy and echo; AC 87%, CHINO 19cm, CL 5.1cm LOW RISK NIPT  06/10/22 at [facility]: Appropriate fetal growth; Normal repeat echo; AC 72%, Overall 64%, CHINO 20 cm, Dopplers WNL            Previous gestational diabetes mellitus, antepartum, third trimester 2022     Priority: High     2022 at [facility]: Pt's baseline a1c 5.7Passed early 2 hr GTT  06/10/22 at [facility]: Passed 2 hr GTT        Anemia affecting third pregnancy 06/10/2022     Priority: Medium     22 Hgb 10.4  06/10/22 at [facility]: Anemia noted on recent lab studies. Physiologic anemia of pregnancy is usually mild. Start iron supplementation. ? Counseled to take iron with vitamin C, and to avoid taking with iron rich meals. ?   Take with stool softener, if not included in iron formulation. Check CBC in 4 weeks with TIBC and serum ferritin. ? If Hgb<9.5 or low iron stores, refer to hematology for consult and iron infusions.         History of placenta abruption 2022     Priority: Medium     2022 at [facility]: With G6 in 2019.  Hx successful  (vaginal birth after ), currently pregnant 2022     Priority: Low    Obesity affecting pregnancy in third trimester 10/14/2019     Priority: Low        Addressed normal pregnancy complaints, reassured and offered suggestions for care  Reviewed gestational age precautions and activity goals/limitations  Nutritional counseling as well as specific goals based on current maternal and fetal status  Options for GERD therapy if becomes symptomatic over course of pregnancy  Gestational age appropriate preventive care regarding communicable disease transmission and vaccines as appropriate (including flu, TDaP, and COVID.)  Additional plans and concerns as documented in problem list.   All questions answered and concerns discussed. An electronic signature was used to authenticate this note.   Leonard Strong MD    I have spent 40 minutes reviewing previous notes, test results and face to face with the patient discussing the diagnosis and importance of compliance with the treatment plan, in addition to ultrasound findings, as well as documenting on the day of the visit (6/10/2022)    Return as needed for concerns      Patient Active Problem List   Diagnosis Code    Hx successful  (vaginal birth after ), currently pregnant O31.200    History of placenta abruption Z87.59    High risk multigravida in third trimester O09.43    Obesity affecting pregnancy in third trimester O99.213    Previous gestational diabetes mellitus, antepartum, third trimester O09.293, Z80.34    Anemia affecting third pregnancy O99.019

## 2022-06-30 ENCOUNTER — ROUTINE PRENATAL (OUTPATIENT)
Dept: OBGYN CLINIC | Age: 32
End: 2022-06-30
Payer: COMMERCIAL

## 2022-06-30 VITALS — BODY MASS INDEX: 48.23 KG/M2 | WEIGHT: 281 LBS | SYSTOLIC BLOOD PRESSURE: 136 MMHG | DIASTOLIC BLOOD PRESSURE: 84 MMHG

## 2022-06-30 DIAGNOSIS — Z86.32 PREVIOUS GESTATIONAL DIABETES MELLITUS, ANTEPARTUM, THIRD TRIMESTER: ICD-10-CM

## 2022-06-30 DIAGNOSIS — O09.293 PREVIOUS GESTATIONAL DIABETES MELLITUS, ANTEPARTUM, THIRD TRIMESTER: ICD-10-CM

## 2022-06-30 DIAGNOSIS — O09.43 HIGH RISK MULTIGRAVIDA IN THIRD TRIMESTER: ICD-10-CM

## 2022-06-30 DIAGNOSIS — Z87.59 HISTORY OF PLACENTA ABRUPTION: ICD-10-CM

## 2022-06-30 DIAGNOSIS — O99.019 ANEMIA AFFECTING THIRD PREGNANCY: Primary | ICD-10-CM

## 2022-06-30 DIAGNOSIS — O34.219 HX SUCCESSFUL VBAC (VAGINAL BIRTH AFTER CESAREAN), CURRENTLY PREGNANT: ICD-10-CM

## 2022-06-30 DIAGNOSIS — O99.213 OBESITY AFFECTING PREGNANCY IN THIRD TRIMESTER: ICD-10-CM

## 2022-06-30 PROCEDURE — 76816 OB US FOLLOW-UP PER FETUS: CPT | Performed by: OBSTETRICS & GYNECOLOGY

## 2022-06-30 PROCEDURE — 99902 PR PRENATAL VISIT: CPT | Performed by: OBSTETRICS & GYNECOLOGY

## 2022-06-30 NOTE — PROGRESS NOTES
Nancy  presents for WENDIE. [id number]. 31w3d. PL and MFM notes reviewed as applicable. Taking Prenatal Vitamins: Yes  She is noticing:  no unusual complaints    No problem-specific Assessment & Plan notes found for this encounter.

## 2022-07-13 ENCOUNTER — ROUTINE PRENATAL (OUTPATIENT)
Dept: OBGYN CLINIC | Age: 32
End: 2022-07-13

## 2022-07-13 VITALS — DIASTOLIC BLOOD PRESSURE: 82 MMHG | BODY MASS INDEX: 47.89 KG/M2 | WEIGHT: 279 LBS | SYSTOLIC BLOOD PRESSURE: 124 MMHG

## 2022-07-13 DIAGNOSIS — O99.213 OBESITY AFFECTING PREGNANCY IN THIRD TRIMESTER: ICD-10-CM

## 2022-07-13 DIAGNOSIS — O99.013 ANEMIA AFFECTING PREGNANCY IN THIRD TRIMESTER: ICD-10-CM

## 2022-07-13 DIAGNOSIS — O99.019 ANEMIA AFFECTING THIRD PREGNANCY: ICD-10-CM

## 2022-07-13 DIAGNOSIS — O09.43 HIGH RISK MULTIGRAVIDA IN THIRD TRIMESTER: Primary | ICD-10-CM

## 2022-07-13 DIAGNOSIS — Z87.59 HISTORY OF PLACENTA ABRUPTION: ICD-10-CM

## 2022-07-13 DIAGNOSIS — Z3A.33 33 WEEKS GESTATION OF PREGNANCY: ICD-10-CM

## 2022-07-13 DIAGNOSIS — O47.03 PRETERM UTERINE CONTRACTIONS, ANTEPARTUM, THIRD TRIMESTER: ICD-10-CM

## 2022-07-13 PROCEDURE — 99902 PR PRENATAL VISIT: CPT | Performed by: OBSTETRICS & GYNECOLOGY

## 2022-07-13 NOTE — PROGRESS NOTES
Christen Harveysamantha  presents for WENDIE. O9O3940. 33w2d. PL and MFM notes reviewed as applicable. Taking Prenatal Vitamins: Yes  She is noticing: lor blackwell, cramping still. Having some GI changes. Cramping when on feet a lot. cx cl/th/hi   NST--->R with baseline 125. Only 1 ctx in 20min    As some impt info is always in the OB flowsheet, please also refer to that. Anemia affecting third pregnancy  Ck cbc today    Obesity affecting pregnancy in third trimester  bmi 48+  Growth scan on 6-30 with ?developing macrosomia.  AC was 90%ile  D/w pt weekly fetal testing now d/t higher risk for IUFD at this bmi  Plan bpp/growth scan next wk

## 2022-07-14 LAB
ERYTHROCYTE [DISTWIDTH] IN BLOOD BY AUTOMATED COUNT: 16 % (ref 11.9–14.6)
HCT VFR BLD AUTO: 35.7 % (ref 35.8–46.3)
HGB BLD-MCNC: 10.5 G/DL (ref 11.7–15.4)
MCH RBC QN AUTO: 24.5 PG (ref 26.1–32.9)
MCHC RBC AUTO-ENTMCNC: 29.4 G/DL (ref 31.4–35)
MCV RBC AUTO: 83.2 FL (ref 79.6–97.8)
NRBC # BLD: 0 K/UL (ref 0–0.2)
PLATELET # BLD AUTO: 292 K/UL (ref 150–450)
PMV BLD AUTO: 12.6 FL (ref 9.4–12.3)
RBC # BLD AUTO: 4.29 M/UL (ref 4.05–5.2)
WBC # BLD AUTO: 7.6 K/UL (ref 4.3–11.1)

## 2022-07-19 ENCOUNTER — ROUTINE PRENATAL (OUTPATIENT)
Dept: OBGYN CLINIC | Age: 32
End: 2022-07-19
Payer: COMMERCIAL

## 2022-07-19 VITALS
DIASTOLIC BLOOD PRESSURE: 82 MMHG | WEIGHT: 281 LBS | BODY MASS INDEX: 47.97 KG/M2 | HEIGHT: 64 IN | SYSTOLIC BLOOD PRESSURE: 118 MMHG

## 2022-07-19 DIAGNOSIS — O99.013 ANEMIA AFFECTING PREGNANCY IN THIRD TRIMESTER: ICD-10-CM

## 2022-07-19 DIAGNOSIS — O99.213 OBESITY AFFECTING PREGNANCY IN THIRD TRIMESTER: ICD-10-CM

## 2022-07-19 DIAGNOSIS — O09.43 HIGH RISK MULTIGRAVIDA IN THIRD TRIMESTER: Primary | ICD-10-CM

## 2022-07-19 DIAGNOSIS — Z87.59 HISTORY OF PLACENTA ABRUPTION: ICD-10-CM

## 2022-07-19 PROCEDURE — 99902 PR PRENATAL VISIT: CPT | Performed by: OBSTETRICS & GYNECOLOGY

## 2022-07-19 PROCEDURE — 76816 OB US FOLLOW-UP PER FETUS: CPT | Performed by: OBSTETRICS & GYNECOLOGY

## 2022-07-19 PROCEDURE — 76819 FETAL BIOPHYS PROFIL W/O NST: CPT | Performed by: OBSTETRICS & GYNECOLOGY

## 2022-07-19 NOTE — PROGRESS NOTES
Js Lopez  presents for WENDIE. U1K2117. 34w1d. PL and MFM notes reviewed as applicable. Taking Prenatal Vitamins: Yes  She is noticing:  Fetal movement still frequent, less forceful this week. As some impt info is always in the [place] flowsheet, please also refer to that. High risk multigravida in third trimester  Growth 61%, AC 81%.  jhony 15

## 2022-07-26 ENCOUNTER — ROUTINE PRENATAL (OUTPATIENT)
Dept: OBGYN CLINIC | Age: 32
End: 2022-07-26
Payer: COMMERCIAL

## 2022-07-26 VITALS — BODY MASS INDEX: 49.26 KG/M2 | WEIGHT: 287 LBS | SYSTOLIC BLOOD PRESSURE: 130 MMHG | DIASTOLIC BLOOD PRESSURE: 80 MMHG

## 2022-07-26 DIAGNOSIS — O99.213 OBESITY AFFECTING PREGNANCY IN THIRD TRIMESTER: ICD-10-CM

## 2022-07-26 DIAGNOSIS — O09.293 PREVIOUS GESTATIONAL DIABETES MELLITUS, ANTEPARTUM, THIRD TRIMESTER: ICD-10-CM

## 2022-07-26 DIAGNOSIS — O99.019 ANEMIA AFFECTING THIRD PREGNANCY: Primary | ICD-10-CM

## 2022-07-26 DIAGNOSIS — Z87.59 HISTORY OF PLACENTA ABRUPTION: ICD-10-CM

## 2022-07-26 DIAGNOSIS — O09.43 HIGH RISK MULTIGRAVIDA IN THIRD TRIMESTER: ICD-10-CM

## 2022-07-26 DIAGNOSIS — Z86.32 PREVIOUS GESTATIONAL DIABETES MELLITUS, ANTEPARTUM, THIRD TRIMESTER: ICD-10-CM

## 2022-07-26 PROCEDURE — 99902 PR PRENATAL VISIT: CPT | Performed by: OBSTETRICS & GYNECOLOGY

## 2022-07-26 PROCEDURE — 76819 FETAL BIOPHYS PROFIL W/O NST: CPT | Performed by: OBSTETRICS & GYNECOLOGY

## 2022-07-26 NOTE — PROGRESS NOTES
Veda Handy  presents for WENDIE. [id number]. 35w1d. PL and MFM notes reviewed as applicable. Taking Prenatal Vitamins: Yes  She is noticing:  no unusual complaints    No problem-specific Assessment & Plan notes found for this encounter.

## 2022-07-27 ENCOUNTER — TELEPHONE (OUTPATIENT)
Dept: OBGYN CLINIC | Age: 32
End: 2022-07-27

## 2022-07-27 NOTE — TELEPHONE ENCOUNTER
Call from pt requesting protein urine result from yesterday. Protein was negative. Pt states she had an \"8 pound\" weight gain in a week w/c/o swelling in her feet. Pt was seen on 7/19/22 and 7/26/22 with a 6# weight gain. Advised pt to hydrate with 64-80 oz of water daily, walk around every 1-2 hours, and elevate feet on 2-3 pillows when sitting/laying down. Pt to call if experiences blurred vision and/or HA. Pt states when \"I bend down\" c/o seeing stars. Instructed pt to bend at knees at not the waist d/t BP changes. Pt states understanding.

## 2022-07-28 ENCOUNTER — TELEPHONE (OUTPATIENT)
Dept: OBGYN CLINIC | Age: 32
End: 2022-07-28

## 2022-07-28 NOTE — TELEPHONE ENCOUNTER
VM received from pt requesting letter for dental work. Letter created and available to pt in [employer]. Call to pt. No answer. LM stating can access letter in [address] [place] [address] [address].

## 2022-08-02 ENCOUNTER — ROUTINE PRENATAL (OUTPATIENT)
Dept: OBGYN CLINIC | Age: 32
End: 2022-08-02
Payer: COMMERCIAL

## 2022-08-02 VITALS — SYSTOLIC BLOOD PRESSURE: 138 MMHG | BODY MASS INDEX: 48.23 KG/M2 | WEIGHT: 281 LBS | DIASTOLIC BLOOD PRESSURE: 80 MMHG

## 2022-08-02 DIAGNOSIS — O09.293 PREVIOUS GESTATIONAL DIABETES MELLITUS, ANTEPARTUM, THIRD TRIMESTER: ICD-10-CM

## 2022-08-02 DIAGNOSIS — O99.019 ANEMIA AFFECTING THIRD PREGNANCY: Primary | ICD-10-CM

## 2022-08-02 DIAGNOSIS — O99.213 OBESITY AFFECTING PREGNANCY IN THIRD TRIMESTER: ICD-10-CM

## 2022-08-02 DIAGNOSIS — Z86.32 PREVIOUS GESTATIONAL DIABETES MELLITUS, ANTEPARTUM, THIRD TRIMESTER: ICD-10-CM

## 2022-08-02 DIAGNOSIS — O09.43 HIGH RISK MULTIGRAVIDA IN THIRD TRIMESTER: ICD-10-CM

## 2022-08-02 DIAGNOSIS — Z36.85 SCREENING, ANTENATAL, FOR STREPTOCOCCUS B: ICD-10-CM

## 2022-08-02 DIAGNOSIS — Z87.59 HISTORY OF PLACENTA ABRUPTION: ICD-10-CM

## 2022-08-02 PROCEDURE — 76819 FETAL BIOPHYS PROFIL W/O NST: CPT | Performed by: OBSTETRICS & GYNECOLOGY

## 2022-08-02 PROCEDURE — 99902 PR PRENATAL VISIT: CPT | Performed by: OBSTETRICS & GYNECOLOGY

## 2022-08-02 NOTE — PROGRESS NOTES
Yris Khoury  presents for WENDIE. A4S1870. 36w1d. PL and MFM notes reviewed as applicable. Taking Prenatal Vitamins: Yes  She is noticing:  Pt c/o noticing more lor blackwell contractions since stomach being upset, using the bathroom a lot since last night. Pt wants cervix checked today    No problem-specific Assessment & Plan notes found for this encounter.

## 2022-08-06 LAB
BACTERIA SPEC CULT: NORMAL
SERVICE CMNT-IMP: NORMAL

## 2022-08-11 ENCOUNTER — HOSPITAL ENCOUNTER (OUTPATIENT)
Age: 32
Setting detail: OBSERVATION
Discharge: HOME | End: 2022-08-12
Attending: OBSTETRICS & GYNECOLOGY | Admitting: OBSTETRICS & GYNECOLOGY
Payer: COMMERCIAL

## 2022-08-11 PROBLEM — O46.93 VAGINAL BLEEDING IN PREGNANCY, THIRD TRIMESTER: Status: ACTIVE | Noted: 2022-08-11

## 2022-08-11 LAB
ALBUMIN SERPL-MCNC: 2.5 G/DL (ref 3.5–5)
ALBUMIN/GLOB SERPL: 0.7 {RATIO} (ref 1.2–3.5)
ALP SERPL-CCNC: 183 U/L (ref 50–136)
ALT SERPL-CCNC: 18 U/L (ref 12–65)
ANION GAP SERPL CALC-SCNC: 6 MMOL/L (ref 7–16)
APTT PPP: 25.9 SEC (ref 24.1–35.1)
AST SERPL-CCNC: 23 U/L (ref 15–37)
BILIRUB SERPL-MCNC: 0.2 MG/DL (ref 0.2–1.1)
BUN SERPL-MCNC: 12 MG/DL (ref 6–23)
CALCIUM SERPL-MCNC: 8.8 MG/DL (ref 8.3–10.4)
CHLORIDE SERPL-SCNC: 110 MMOL/L (ref 98–107)
CO2 SERPL-SCNC: 22 MMOL/L (ref 21–32)
CREAT SERPL-MCNC: 0.63 MG/DL (ref 0.6–1)
ERYTHROCYTE [DISTWIDTH] IN BLOOD BY AUTOMATED COUNT: 15.6 % (ref 11.9–14.6)
FIBRINOGEN PPP-MCNC: 391 MG/DL (ref 190–501)
GLOBULIN SER CALC-MCNC: 3.8 G/DL (ref 2.3–3.5)
GLUCOSE SERPL-MCNC: 74 MG/DL (ref 65–100)
HCT VFR BLD AUTO: 32.9 % (ref 35.8–46.3)
HGB BLD-MCNC: 10.5 G/DL (ref 11.7–15.4)
HISTORY CHECK: NORMAL
INR PPP: 0.9
MCH RBC QN AUTO: 24.5 PG (ref 26.1–32.9)
MCHC RBC AUTO-ENTMCNC: 31.9 G/DL (ref 31.4–35)
MCV RBC AUTO: 76.7 FL (ref 79.6–97.8)
NRBC # BLD: 0 K/UL (ref 0–0.2)
PLATELET # BLD AUTO: 270 K/UL (ref 150–450)
PMV BLD AUTO: 12.1 FL (ref 9.4–12.3)
POTASSIUM SERPL-SCNC: 3.9 MMOL/L (ref 3.5–5.1)
PROT SERPL-MCNC: 6.3 G/DL (ref 6.3–8.2)
PROTHROMBIN TIME: 12.5 SEC (ref 12.6–14.5)
RBC # BLD AUTO: 4.29 M/UL (ref 4.05–5.2)
SODIUM SERPL-SCNC: 138 MMOL/L (ref 136–145)
WBC # BLD AUTO: 5.9 K/UL (ref 4.3–11.1)

## 2022-08-11 PROCEDURE — 85730 THROMBOPLASTIN TIME PARTIAL: CPT

## 2022-08-11 PROCEDURE — 80053 COMPREHEN METABOLIC PANEL: CPT

## 2022-08-11 PROCEDURE — 86901 BLOOD TYPING SEROLOGIC RH(D): CPT

## 2022-08-11 PROCEDURE — 86923 COMPATIBILITY TEST ELECTRIC: CPT

## 2022-08-11 PROCEDURE — G0378 HOSPITAL OBSERVATION PER HR: HCPCS

## 2022-08-11 PROCEDURE — 99284 EMERGENCY DEPT VISIT MOD MDM: CPT

## 2022-08-11 PROCEDURE — 85027 COMPLETE CBC AUTOMATED: CPT

## 2022-08-11 PROCEDURE — 6370000000 HC RX 637 (ALT 250 FOR IP): Performed by: OBSTETRICS & GYNECOLOGY

## 2022-08-11 PROCEDURE — 85384 FIBRINOGEN ACTIVITY: CPT

## 2022-08-11 PROCEDURE — 2580000003 HC RX 258: Performed by: OBSTETRICS & GYNECOLOGY

## 2022-08-11 PROCEDURE — 99223 1ST HOSP IP/OBS HIGH 75: CPT | Performed by: OBSTETRICS & GYNECOLOGY

## 2022-08-11 PROCEDURE — 76818 FETAL BIOPHYS PROFILE W/NST: CPT | Performed by: OBSTETRICS & GYNECOLOGY

## 2022-08-11 PROCEDURE — 85610 PROTHROMBIN TIME: CPT

## 2022-08-11 PROCEDURE — 87591 N.GONORRHOEAE DNA AMP PROB: CPT

## 2022-08-11 RX ORDER — DIPHENHYDRAMINE HCL 25 MG
25 CAPSULE ORAL SEE ADMIN INSTRUCTIONS
Status: DISCONTINUED | OUTPATIENT
Start: 2022-08-11 | End: 2022-08-12 | Stop reason: HOSPADM

## 2022-08-11 RX ORDER — ONDANSETRON 4 MG/1
4 TABLET, ORALLY DISINTEGRATING ORAL EVERY 8 HOURS PRN
Status: DISCONTINUED | OUTPATIENT
Start: 2022-08-11 | End: 2022-08-12 | Stop reason: HOSPADM

## 2022-08-11 RX ORDER — ONDANSETRON 2 MG/ML
4 INJECTION INTRAMUSCULAR; INTRAVENOUS EVERY 6 HOURS PRN
Status: DISCONTINUED | OUTPATIENT
Start: 2022-08-11 | End: 2022-08-12 | Stop reason: HOSPADM

## 2022-08-11 RX ORDER — SODIUM CHLORIDE 9 MG/ML
INJECTION, SOLUTION INTRAVENOUS PRN
Status: DISCONTINUED | OUTPATIENT
Start: 2022-08-11 | End: 2022-08-12 | Stop reason: HOSPADM

## 2022-08-11 RX ORDER — SODIUM CHLORIDE, SODIUM LACTATE, POTASSIUM CHLORIDE, CALCIUM CHLORIDE 600; 310; 30; 20 MG/100ML; MG/100ML; MG/100ML; MG/100ML
INJECTION, SOLUTION INTRAVENOUS CONTINUOUS
Status: DISCONTINUED | OUTPATIENT
Start: 2022-08-11 | End: 2022-08-11

## 2022-08-11 RX ORDER — ACETAMINOPHEN 325 MG/1
650 TABLET ORAL SEE ADMIN INSTRUCTIONS
Status: DISCONTINUED | OUTPATIENT
Start: 2022-08-11 | End: 2022-08-12 | Stop reason: HOSPADM

## 2022-08-11 RX ORDER — ACETAMINOPHEN 325 MG/1
650 TABLET ORAL EVERY 4 HOURS PRN
Status: DISCONTINUED | OUTPATIENT
Start: 2022-08-11 | End: 2022-08-12 | Stop reason: HOSPADM

## 2022-08-11 RX ADMIN — ACETAMINOPHEN 650 MG: 325 TABLET, FILM COATED ORAL at 12:36

## 2022-08-11 RX ADMIN — SODIUM CHLORIDE, POTASSIUM CHLORIDE, SODIUM LACTATE AND CALCIUM CHLORIDE: 600; 310; 30; 20 INJECTION, SOLUTION INTRAVENOUS at 11:15

## 2022-08-11 RX ADMIN — ACETAMINOPHEN 650 MG: 325 TABLET, FILM COATED ORAL at 19:53

## 2022-08-11 ASSESSMENT — PAIN DESCRIPTION - LOCATION: LOCATION: NECK;SHOULDER

## 2022-08-11 ASSESSMENT — PAIN SCALES - GENERAL: PAINLEVEL_OUTOF10: 3

## 2022-08-11 NOTE — PROGRESS NOTES
Orders to discontinue monitoring and order regular diet. BPP 8/8 per Dr. Rosa Hunter. Will keep pt over night and evaluate bleeding through the night and decide POC.

## 2022-08-11 NOTE — PROGRESS NOTES
Pt up to void, states she had 2 scant brown spots on pad and a scant amount on tissue after voiding.

## 2022-08-11 NOTE — PROGRESS NOTES
Pt arrived to [facility] with c/o bright red bleeding this morning. States she feels occasional Ucs, but has been having lor blackwell for last few week and pelvic pressure. Kartik any intercourse in the past 24 hours. Dr. Chan Lav notified and will come assess pt.

## 2022-08-11 NOTE — H&P
[facility] History and Physical Exam    CC  Chief Complaint   Patient presents with    Pregnancy Problem     bleeding       History:    34 y.o. female [address] at 37w3d weeks gestation who is requesting evaluation for vaginal bleeding. Onset was at 0800 today when she was standing in line at the vet's office. She felt the bleeding and noted it soaked through her undergarment. She felt some cramping pain in the lower abdomen. States pain is mild 5/10. Denies any recent intercourse or trauma. No dysuria or vaginal irritation. Reports mild Ha on and off the past several weeks, occasional \"floaters\" in visual field, no current visual changes, no RUQ pain or swelling in extremities. Symptoms have been gradually improving since. Fetal movement has been normal.  Prenatal care has been complicated by hx of CS x 1, hx of successful  x5  Hx of placental abruption in 2019  Obesity  Hx of GDM in previous pregnancy    HISTORY:    Social History     Substance and Sexual Activity   Sexual Activity Yes    Partners: Male     Patient's last menstrual period was 2021.     Social History     Socioeconomic History    Marital status:      Spouse name: Not on file    Number of children: Not on file    Years of education: Not on file    Highest education level: Not on file   Occupational History    Not on file   Tobacco Use    Smoking status: Never    Smokeless tobacco: Never   Substance and Sexual Activity    Alcohol use: Not Currently    Drug use: Never    Sexual activity: Yes     Partners: Male   Other Topics Concern    Not on file   Social History Narrative    Not on file     Social Determinants of Health     Financial Resource Strain: Not on file   Food Insecurity: Not on file   Transportation Needs: Not on file   Physical Activity: Not on file   Stress: Not on file   Social Connections: Not on file   Intimate Partner Violence: Not on file   Housing Stability: Not on file       Past Surgical History:   Procedure Laterality Date    APPENDECTOMY       SECTION N/A     TONSILLECTOMY         Past Medical History:   Diagnosis Date    Shortness of breath due to pregnancy in third trimester 2022         ROS:  Constitutional: Negative. HENT: Negative. Eyes: Negative. Respiratory: Negative. Cardiovascular: Negative. Gastrointestinal: negative  Genitourinary: Vaginal bleeding present this am, not currently having bleeding  Musculoskeletal: Negative. Skin: Negative. Neurological: Negative. Endo/Heme/Allergies: Negative. Psychiatric/Behavioral: Negative. PHYSICAL EXAM:  Temperature 98.9 °F (37.2 °C), temperature source Oral, last menstrual period 2021, unknown if currently breastfeeding. General: without distress  Resp:  breath sounds clear and equal bilaterally  Card:  RRR, no MRG, Tachy or murmur  Abd: gravid, nontender, no rebound or guarding    Uterine activity: irritability, irregular contractions  Fetal Assessment: Baseline FHR: 130 beats per minute     Fetal heart variability: moderate     Fetal Heart Rate decelerations: none     Fetal Heart Rate accelerations: present   Prestentation: vertex by us  Pelvic:   External- normal external genitalia, no lesions    SSE-negative pooling, negative valsalva, there is mucous discharge with dark blood in vault, no active bleeding visualized    SVE- cervix 1 cm dilated, 20 %effaced, presenting part vertex, -3 station. Ext: edema, clonus and DTR's normal  Assessment:  34 y.o. female at 37w3d weeks gestation  Vaginal bleeding in third trimester  Hx of placental abruption in 2019  Hx of previous  section x 1  Hx of  successful x 5  Obesity  Hx of GDM in prior pregnancy  Hx of anemia  Category 1 tracing  Blood type Rh positive        Plan:  Admit for observation per Dr. Brianna Degroot.   FWB reassuring at this time with category 1 tracing, continuous monitoring  CBC, type and crossmatch, PT/PTT/INR and fibrinogen ordered, CMP ordered  Patient denies current sx of preeclampsia, serial Bps ordered, urine with large blood and 30 mg/dl protein  NPO status   IV access with fluids initiated  Crossmatch 2 units  MFM consult with formal ultrasound planned    Plan discussed with Dr. Catrina Alcaraz. Reviewed with patient plan of care and questions answered. Discussed delivery indications (further VB, any fetal distress or maternal destabilization) and need for NPO status currently. She expressed understanding. She would accept blood products if indicated.

## 2022-08-11 NOTE — CONSULTS
Called [place] to check the status of [employer] since they were suppose to reach out to the patient  Spoke to Elsie Ford who stated that they needed to update the patient's phone number  They should be calling him in 1 to 2 business days  Patient is aware  Years of education: Not on file    Highest education level: Not on file   Occupational History    Not on file   Tobacco Use    Smoking status: Never    Smokeless tobacco: Never   Substance and Sexual Activity    Alcohol use: Not Currently    Drug use: Never    Sexual activity: Yes     Partners: Male   Other Topics Concern    Not on file   Social History Narrative    Not on file     Social Determinants of Health     Financial Resource Strain: Not on file   Food Insecurity: Not on file   Transportation Needs: Not on file   Physical Activity: Not on file   Stress: Not on file   Social Connections: Not on file   Intimate Partner Violence: Not on file   Housing Stability: Not on file       No Known Allergies      Current Facility-Administered Medications:     ondansetron (ZOFRAN-ODT) disintegrating tablet 4 mg, 4 mg, Oral, Q8H PRN **OR** ondansetron (ZOFRAN) injection 4 mg, 4 mg, IntraVENous, Q6H PRN, Molly Lynn MD    acetaminophen (TYLENOL) tablet 650 mg, 650 mg, Oral, Q4H PRN, Molly Lynn MD, 650 mg at 08/11/22 1236    lactated ringers infusion, , IntraVENous, Continuous, Molly Lynn MD, Last Rate: 125 mL/hr at 08/11/22 1115, New Bag at 08/11/22 1115    0.9 % sodium chloride infusion, , IntraVENous, PRN, Molly Lynn MD    acetaminophen (TYLENOL) tablet 650 mg, 650 mg, Oral, See Admin Instructions, Molly Lynn MD    diphenhydrAMINE (BENADRYL) capsule 25 mg, 25 mg, Oral, See Admin Instructions, Molly Lynn MD    Objective:     Vitals:     Patient Vitals for the past 24 hrs:   Temp Pulse BP   08/11/22 1014 -- 71 126/66   08/11/22 0941 98.9 °F (37.2 °C) 68 131/80        I&O:   No intake/output data recorded. No intake/output data recorded. Output by Drain (mL) 08/09/22 0701 - 08/09/22 1900 08/09/22 1901 - 08/10/22 0700 08/10/22 0701 - 08/10/22 1900 08/10/22 1901 - 08/11/22 0700 08/11/22 0701 - 08/11/22 1249   Patient has no LDAs of requested type attached.         Exam:   Constitutional: Patient without distress. HEENT: Symmetric without facial palsy, uncorrected poor hearing or uncorrected poor vision. No thyroid enlargement or goiter  Chest: No use of accessory muscles or excessive work of breathing  Abdomen: gravid, soft, non-tender; Fundus: soft and non tender. US done by MD, no tenderness or pain at any time. Genitourinary: deferred as without complaints of labor or ROM, No sign of blood or amniotic fluid, external genitalia normal, and vagina without evidence of vaginitis; Cervical Exam:  Vital SignsTemp: 98.9 °F (37.2 °C)Temp Source: OralHeart Rate: 71BP: 126/66 Effacement: 20 and deferred  Lower Extremities:  Edema: 1+ bilaterally  Patellar Reflexes: 1+ bilaterally; Clonus: absent  Skin: normal coloration and turgor, no rashes, no suspicious skin lesions noted. Neurologic: AOx3. Gait normal. Reflexes and motor strength normal and symmetric. Cranial nerves 2-12 and sensation grossly intact. Psychiatric: Mood appropriate, non focal    Maternal and Fetal Monitoring:                              Uterine Activity:     Occasional contraction, no decels associated with contractions. Fetal Heart Rate:     140's    Non Stress Test Interpretation      FHR Baseline Rate: 145 bpm  Periodic Changes: Yes  Variability: Moderate    Results:    -Reactive  -No decelerations associated with contractions. Comments:  No sign of abruption or fetal stress. MD: Nando Montes    Labs:   Did not import. Reviewed all results    Imaging:    Formal report in chart. Date: 2022 Performed at bedside by Dr. Rocio Aviles   Cephalic, Left Lateral placenta, no separation or SC hemorrhage seen. Grade 3. BPP-8/8, CHINO 14 cm    Assessment and Plan:  34 y.o.  at 37w3d with Vaginal bleeding, moderate, single episode, no sign of abruption. Etiology either cervical bleed, but Cx 1 cm, no change from previous exam. Possible marginal abruption. No sign of fetal compromise.   CHINO=14 cm, normal but down from 27 cm last week so may indicate decreasing. Recommendations  Observe for 24 hours. Induce if significant bleeding occurs. If no further bleeding, home with twice weekly fetal testing BPPs to follow CHINO. Induce at 39 weeks. Patient Active Problem List    Diagnosis Date Noted    High risk multigravida in third trimester 2022     Priority: High     2022 at [facility]: Normal NT 1.7mm, nasal bone seen, genetic counseling done by MD;   4/15/2022 at [facility]: Normal anatomy and echo; AC 87%, CHINO 19cm, CL 5.1cm LOW RISK NIPT  06/10/22 at [facility]: Appropriate fetal growth; Normal repeat echo; AC 72%, Overall 64%, CHINO 20 cm, Dopplers WNL  - 2022 31w3d Appropriate growth, 69% overall, symmetric. AF=20.  -  good growth (62%, symmetric)          Previous gestational diabetes mellitus, antepartum, third trimester 2022     Priority: High     2022 at [facility]: Pt's baseline a1c 5.7  -Passed early 2 hr GTT      Anemia affecting third pregnancy 06/10/2022     Priority: Medium     22 Hgb 10.4  - Fe started. Check CBC in 4 weeks with TIBC and serum ferritin. If Hgb<9.5 or low iron stores, refer to hematology for consult and iron infusions. CBC 33-34 wks hgb 10.5        History of placenta abruption 2022     Priority: Medium     2022 at [facility]: With G6 in 2019. Hx successful  (vaginal birth after ), currently pregnant 2022     Priority: Low     - CS w first.   x 5 since      Obesity affecting pregnancy in third trimester 10/14/2019     Priority: Low     BMI 47+. Needs fetal testing d/t higher risk IUFD at this BMI. Time:110    Minutes spent on floor,with greater than 50% of the time examining patient, explaining plan and coordinating care with nurse and requesting primary physician.

## 2022-08-12 VITALS
RESPIRATION RATE: 15 BRPM | HEART RATE: 63 BPM | OXYGEN SATURATION: 97 % | SYSTOLIC BLOOD PRESSURE: 127 MMHG | TEMPERATURE: 98.6 F | DIASTOLIC BLOOD PRESSURE: 70 MMHG

## 2022-08-12 PROBLEM — O46.90 VAGINAL BLEEDING DURING PREGNANCY: Status: ACTIVE | Noted: 2022-08-12

## 2022-08-12 PROCEDURE — 59025 FETAL NON-STRESS TEST: CPT

## 2022-08-12 PROCEDURE — 59025 FETAL NON-STRESS TEST: CPT | Performed by: OBSTETRICS & GYNECOLOGY

## 2022-08-12 PROCEDURE — G0378 HOSPITAL OBSERVATION PER HR: HCPCS

## 2022-08-12 PROCEDURE — 99217 PR OBSERVATION CARE DISCHARGE MANAGEMENT: CPT | Performed by: OBSTETRICS & GYNECOLOGY

## 2022-08-12 NOTE — PROGRESS NOTES
Ante Partum High Risk Pregnancy Note  Kenia Morris  [id number]    Patient admitted for vag bleeding at 37+ weeks. States she feels GFM and has only had dark d/c. No further missy bldg. The bldg occurred when she was active, walking. She lives 30min away and has young children to care for. Vitals:  Patient Vitals for the past 24 hrs:   BP Temp Temp src Pulse Resp SpO2   22 0844 123/62 98.4 °F (36.9 °C) Oral 59 20 --   22 1935 127/63 -- -- 70 15 97 %   22 193 -- 98.9 °F (37.2 °C) Oral -- -- --     Temp (24hrs), Av.7 °F (37.1 °C), Min:98.4 °F (36.9 °C), Max:98.9 °F (37.2 °C)    I&O:  No intake/output data recorded. No intake/output data recorded. Exam:  Patient without distress. Abdomen soft, non-tender               Fundus soft, firm, and non tender                        Bedside US yday by M showed no obvious abruption or [facility]. Assessment:    Patient Active Problem List    Diagnosis Date Noted    Anemia affecting third pregnancy 06/10/2022    Vaginal bleeding in pregnancy, third trimester 2022    High risk multigravida in third trimester 2022    Hx successful  (vaginal birth after ), currently pregnant 2022    History of placenta abruption 2022    Previous gestational diabetes mellitus, antepartum, third trimester 2022    Obesity affecting pregnancy in third trimester 10/14/2019       Plan:   Hgb 10.5 on adm yday. NL plts and coags  Rh+  Will have pt dress and ambulate. This will approximate what she will have to be doing at home. Make sure the bldg seems to be resolved before d/c home.

## 2022-08-12 NOTE — PROGRESS NOTES
8/12/2022    Indication: TIUP with vaginal bleeding. BMI 48  Time on: 0837  Time off: 0848  Baseline: 125  Reactive?  Yes, 15x15, no decels  Contractions: none

## 2022-08-13 NOTE — PROGRESS NOTES
Pt has ambulated quite a bit this afternoon. Has no complaints. GFM. No bleeding. Has just noted very tiny spotting on pad. She'd like to go home. /70   Pulse 63   Temp 98.6 °F (37 °C) (Oral)   Resp 15   LMP 11/22/2021   SpO2 97%     Had another NST this evening with normal baseline, rare ctx, reactive 15x15, no decels. Pt already has appt Mon with BPP. D/w pt that she now needs fetal testing  (BPP each time) twice weekly until delivery. D/c home.

## 2022-08-13 NOTE — PROGRESS NOTES
Pt discharged home per MD orders. VS stable with reactive NST. IV removed. Discharge instructions reviewed with patient and signed, placed in chart. Pt ambulated home with self.  Work note sent home with pt per request.

## 2022-08-13 NOTE — PROGRESS NOTES
[facility]              08-    Patient: Cynthia Su Date of Birth:    Date of Visit: 08--08-       To Whom It May Concern:    Patient was seen at [address] [place] [address] on 8-11-20222022-08- due to medical issues she will be unable to return to work until 8-13-22. If you have any questions or concerns, please don't hesitate to call.     Sincerely,      Riaz Pike RN

## 2022-08-13 NOTE — PROGRESS NOTES
Pt resting in bed. VS WDL. Pt states she would like to talk to Dr. Aaron Carr and then go home.  Pt was instructed earlier in day to walk around

## 2022-08-13 NOTE — DISCHARGE INSTRUCTIONS
emergency care. For example, call if:    You passed out (lost consciousness). You have severe vaginal bleeding. Call your doctor now or seek immediate medical care if:    You have any vaginal bleeding. You have pain in your belly or pelvis. You think that you are in labor. You have a sudden release of fluid from your vagina. You notice that your baby has stopped moving or is moving much less than normal.   Watch closely for changes in your health, and be sure to contact your doctor ifyou have any questions or concerns. Where can you learn more? Go to [email or web address] and sign in to your [employer] account. Enter R006 in the [employer] box to learn more about \"Vaginal Bleeding During Pregnancy: Care Instructions. \"     If you do not have an account, please click on the \"Sign Up Now\" link. Current as of: February 23, 2022               Content Version: 13.3  © 4926-8805 [employer], [employer]. Care instructions adapted under license by [facility] ([facility]). If you have questions about a medical condition or this instruction, always ask your healthcare professional. [place] [address] any warranty or liability for your use of this information.

## 2022-08-13 NOTE — DISCHARGE SUMMARY
Antepartum Discharge Summary     Name: Trupti Bustillos MRN: [id number]  SSN: [id number]    YOB: 1990  Age: 34 y.o. Sex: female      Allergies: Patient has no known allergies. Admit Date: 8/11/2022    Discharge Date: 8/12/2022     Admitting Physician: Andrzej Kline MD     Attending Physician:  Jeanna Cm MD     * Admission Diagnoses: Vaginal bleeding during pregnancy [O46.90]    * Discharge Diagnoses: same, resolved       Lab Results   Component Value Date/Time    ABORH A POSITIVE 08/11/2022 11:03 AM      Immunization History   Administered Date(s) Administered    COVID-19, PFIZER PURPLE top, DILUTE for use, (age 15 y+), 30mcg/0.3mL 08/01/2021    Influenza, Quadv, IM, PF (6 mo and older Fluzone, Flulaval, Fluarix, and 3 yrs and older Afluria) 09/12/2019    Tdap (Boostrix, Adacel) 02/22/2018       * Discharge Condition: Good    * Procedures: observation, level 2 US, MFM consult, fetal monitoring      * Hospital Course:  pt's bleeding resolved while at rest in hospital. Fetal monitoring was reassuring. Level 2 US showed no previa/MOHIT/obvious abruption. Long period of ambulation did not cause recurrent bleeding. * Disposition: Home    Discharge Medications:      Medication List        CONTINUE taking these medications      acetaminophen 500 MG tablet  Commonly known as: TYLENOL     IRON PO     PRENATAL PO              * Follow-up Care/Patient Instructions: Activity: activity as tolerated  Diet:  resume previous diet  Wound Care: none needed    Keep appt with office on Monday. Needs BPP 2x/wk until delivery.

## 2022-08-15 ENCOUNTER — ROUTINE PRENATAL (OUTPATIENT)
Dept: OBGYN CLINIC | Age: 32
End: 2022-08-15
Payer: COMMERCIAL

## 2022-08-15 VITALS
HEIGHT: 64 IN | DIASTOLIC BLOOD PRESSURE: 88 MMHG | BODY MASS INDEX: 47.97 KG/M2 | WEIGHT: 281 LBS | SYSTOLIC BLOOD PRESSURE: 132 MMHG

## 2022-08-15 DIAGNOSIS — Z87.59 HISTORY OF PLACENTA ABRUPTION: ICD-10-CM

## 2022-08-15 DIAGNOSIS — O99.019 ANEMIA AFFECTING THIRD PREGNANCY: Primary | ICD-10-CM

## 2022-08-15 DIAGNOSIS — O99.213 OBESITY AFFECTING PREGNANCY IN THIRD TRIMESTER: ICD-10-CM

## 2022-08-15 DIAGNOSIS — O09.43 HIGH RISK MULTIGRAVIDA IN THIRD TRIMESTER: ICD-10-CM

## 2022-08-15 DIAGNOSIS — O46.93 VAGINAL BLEEDING IN PREGNANCY, THIRD TRIMESTER: ICD-10-CM

## 2022-08-15 DIAGNOSIS — O09.293 PREVIOUS GESTATIONAL DIABETES MELLITUS, ANTEPARTUM, THIRD TRIMESTER: ICD-10-CM

## 2022-08-15 DIAGNOSIS — Z86.32 PREVIOUS GESTATIONAL DIABETES MELLITUS, ANTEPARTUM, THIRD TRIMESTER: ICD-10-CM

## 2022-08-15 LAB
ABO + RH BLD: NORMAL
BLD PROD TYP BPU: NORMAL
BLD PROD TYP BPU: NORMAL
BLOOD BANK DISPENSE STATUS: NORMAL
BLOOD BANK DISPENSE STATUS: NORMAL
BLOOD GROUP ANTIBODIES SERPL: NORMAL
BPU ID: NORMAL
BPU ID: NORMAL
C TRACH RRNA SPEC QL NAA+PROBE: NEGATIVE
CROSSMATCH RESULT: NORMAL
CROSSMATCH RESULT: NORMAL
N GONORRHOEA RRNA SPEC QL NAA+PROBE: NEGATIVE
SPECIMEN EXP DATE BLD: NORMAL
SPECIMEN SOURCE: NORMAL
UNIT DIVISION: 0
UNIT DIVISION: 0

## 2022-08-15 PROCEDURE — 99902 PR PRENATAL VISIT: CPT | Performed by: OBSTETRICS & GYNECOLOGY

## 2022-08-15 PROCEDURE — 76819 FETAL BIOPHYS PROFIL W/O NST: CPT | Performed by: OBSTETRICS & GYNECOLOGY

## 2022-08-15 NOTE — PROGRESS NOTES
Nancy  presents for WENDIE. [id number]. 38w0d. PL and MFM notes reviewed as applicable. Taking Prenatal Vitamins: Yes  She is noticing:  no unusual complaints  As some impt info is always in the [facility] flowsheet, please also refer to that. Pt's cx is not favorable at all. She has remote hx c/s with several successful 's since then. She says she was induced with 2 of them- does not know what her starting cx status was. Really does not want c/s. If forced to choose b/w waiting past 39wks for cx favorability vs c/s, she prefers to wait. Her largest vag baby was 9-16. Needs an up to date growth scan.  bmi is 50. I personally am not comfortable with inducing an unfavorable cx in this situation. Messaging my partners to see what can be done for her.

## 2022-08-18 ENCOUNTER — ANESTHESIA EVENT (OUTPATIENT)
Dept: LABOR AND DELIVERY | Age: 32
End: 2022-08-18
Payer: COMMERCIAL

## 2022-08-18 ENCOUNTER — ROUTINE PRENATAL (OUTPATIENT)
Dept: OBGYN CLINIC | Age: 32
End: 2022-08-18
Payer: COMMERCIAL

## 2022-08-18 ENCOUNTER — ANESTHESIA (OUTPATIENT)
Dept: LABOR AND DELIVERY | Age: 32
End: 2022-08-18
Payer: COMMERCIAL

## 2022-08-18 ENCOUNTER — HOSPITAL ENCOUNTER (INPATIENT)
Age: 32
LOS: 2 days | Discharge: HOME | End: 2022-08-20
Attending: OBSTETRICS & GYNECOLOGY | Admitting: OBSTETRICS & GYNECOLOGY
Payer: COMMERCIAL

## 2022-08-18 VITALS
HEIGHT: 64 IN | WEIGHT: 287 LBS | DIASTOLIC BLOOD PRESSURE: 94 MMHG | BODY MASS INDEX: 49 KG/M2 | SYSTOLIC BLOOD PRESSURE: 148 MMHG

## 2022-08-18 DIAGNOSIS — R03.0 ELEVATED BP WITHOUT DIAGNOSIS OF HYPERTENSION: ICD-10-CM

## 2022-08-18 DIAGNOSIS — Z34.90 ENCOUNTER FOR INDUCTION OF LABOR: Primary | ICD-10-CM

## 2022-08-18 DIAGNOSIS — O26.00 ABNORMAL WEIGHT GAIN IN PREGNANCY: ICD-10-CM

## 2022-08-18 DIAGNOSIS — O09.43 HIGH RISK MULTIGRAVIDA IN THIRD TRIMESTER: Primary | ICD-10-CM

## 2022-08-18 DIAGNOSIS — O99.213 OBESITY AFFECTING PREGNANCY IN THIRD TRIMESTER: ICD-10-CM

## 2022-08-18 PROBLEM — O16.3 HYPERTENSION AFFECTING PREGNANCY IN THIRD TRIMESTER: Status: ACTIVE | Noted: 2022-08-18

## 2022-08-18 LAB
ABO + RH BLD: NORMAL
ALBUMIN SERPL-MCNC: 2.6 G/DL (ref 3.5–5)
ALBUMIN/GLOB SERPL: 0.7 {RATIO} (ref 1.2–3.5)
ALP SERPL-CCNC: 205 U/L (ref 50–136)
ALT SERPL-CCNC: 20 U/L (ref 12–65)
AST SERPL-CCNC: 29 U/L (ref 15–37)
BILIRUB DIRECT SERPL-MCNC: <0.1 MG/DL
BILIRUB SERPL-MCNC: 0.2 MG/DL (ref 0.2–1.1)
BLOOD GROUP ANTIBODIES SERPL: NORMAL
BUN SERPL-MCNC: 11 MG/DL (ref 6–23)
CREAT SERPL-MCNC: 0.59 MG/DL (ref 0.6–1)
CREAT UR-MCNC: 45 MG/DL
ERYTHROCYTE [DISTWIDTH] IN BLOOD BY AUTOMATED COUNT: 16.4 % (ref 11.9–14.6)
GLOBULIN SER CALC-MCNC: 3.8 G/DL (ref 2.3–3.5)
HCT VFR BLD AUTO: 33.3 % (ref 35.8–46.3)
HGB BLD-MCNC: 10.4 G/DL (ref 11.7–15.4)
MCH RBC QN AUTO: 24.3 PG (ref 26.1–32.9)
MCHC RBC AUTO-ENTMCNC: 31.2 G/DL (ref 31.4–35)
MCV RBC AUTO: 77.8 FL (ref 79.6–97.8)
NRBC # BLD: 0 K/UL (ref 0–0.2)
PLATELET # BLD AUTO: 250 K/UL (ref 150–450)
PMV BLD AUTO: 12.3 FL (ref 9.4–12.3)
PROT SERPL-MCNC: 6.4 G/DL (ref 6.3–8.2)
PROT UR-MCNC: 13 MG/DL
PROT/CREAT UR-RTO: 0.3
RBC # BLD AUTO: 4.28 M/UL (ref 4.05–5.2)
SPECIMEN EXP DATE BLD: NORMAL
URATE SERPL-MCNC: 4.8 MG/DL (ref 2.6–6)
WBC # BLD AUTO: 5.4 K/UL (ref 4.3–11.1)

## 2022-08-18 PROCEDURE — 84550 ASSAY OF BLOOD/URIC ACID: CPT

## 2022-08-18 PROCEDURE — 84156 ASSAY OF PROTEIN URINE: CPT

## 2022-08-18 PROCEDURE — 76819 FETAL BIOPHYS PROFIL W/O NST: CPT | Performed by: OBSTETRICS & GYNECOLOGY

## 2022-08-18 PROCEDURE — 80076 HEPATIC FUNCTION PANEL: CPT

## 2022-08-18 PROCEDURE — 3E033VJ INTRODUCTION OF OTHER HORMONE INTO PERIPHERAL VEIN, PERCUTANEOUS APPROACH: ICD-10-PCS | Performed by: OBSTETRICS & GYNECOLOGY

## 2022-08-18 PROCEDURE — 6370000000 HC RX 637 (ALT 250 FOR IP): Performed by: OBSTETRICS & GYNECOLOGY

## 2022-08-18 PROCEDURE — 99902 PR PRENATAL VISIT: CPT | Performed by: OBSTETRICS & GYNECOLOGY

## 2022-08-18 PROCEDURE — 4A1HXCZ MONITORING OF PRODUCTS OF CONCEPTION, CARDIAC RATE, EXTERNAL APPROACH: ICD-10-PCS | Performed by: OBSTETRICS & GYNECOLOGY

## 2022-08-18 PROCEDURE — 86901 BLOOD TYPING SEROLOGIC RH(D): CPT

## 2022-08-18 PROCEDURE — 82565 ASSAY OF CREATININE: CPT

## 2022-08-18 PROCEDURE — 1100000000 HC RM PRIVATE

## 2022-08-18 PROCEDURE — 76816 OB US FOLLOW-UP PER FETUS: CPT | Performed by: OBSTETRICS & GYNECOLOGY

## 2022-08-18 PROCEDURE — 84520 ASSAY OF UREA NITROGEN: CPT

## 2022-08-18 PROCEDURE — 85027 COMPLETE CBC AUTOMATED: CPT

## 2022-08-18 RX ORDER — ZOLPIDEM TARTRATE 5 MG/1
5 TABLET ORAL NIGHTLY PRN
Status: DISCONTINUED | OUTPATIENT
Start: 2022-08-18 | End: 2022-08-19

## 2022-08-18 RX ORDER — ACETAMINOPHEN 325 MG/1
650 TABLET ORAL EVERY 6 HOURS PRN
Status: DISCONTINUED | OUTPATIENT
Start: 2022-08-18 | End: 2022-08-19

## 2022-08-18 RX ADMIN — ACETAMINOPHEN 650 MG: 325 TABLET, FILM COATED ORAL at 19:23

## 2022-08-18 ASSESSMENT — PAIN DESCRIPTION - LOCATION: LOCATION: HEAD

## 2022-08-18 ASSESSMENT — PAIN SCALES - GENERAL: PAINLEVEL_OUTOF10: 3

## 2022-08-18 NOTE — PROGRESS NOTES
Dr. Madeline Dukes on phone. Report given on R-NST, BPP 8/8 in office. May discontinue EFM/TOCO at this time. May have reg diet at this time. Clear liquid at 0000. Orders to start Pitocin at 0600 at 2 mu and go up to 4 mu at 0700. Dr. Balaji Altamirano to come after and assess.

## 2022-08-18 NOTE — H&P
History & Physical    Name: Lola Ceja MRN: [id number]  SSN: [id number]    YOB: 1990  Age: 34 y.o. Sex: female      Subjective:     Reason for Admission:  Pregnancy and GHTN    History of Present Illness: Ms. Trevor Espinoza is a 34 y.o.  female with an estimated gestational age of 36w4d with Estimated Date of Delivery: 22. Pt seen for WENDIE appt. 6# gain in 3d. 2 elevated bp's in office. No visual changes or upper abd pain. +mild HA, relieved with tylenol. +GFM. OB History    Para Term  AB Living   8 6 6   0 6   SAB IAB Ectopic Molar Multiple Live Births   0         6      # Outcome Date GA Lbr Marquise/2nd Weight Sex Delivery Anes PTL Lv   8 Current            7 Term 19 39w0d / 00:12 6 lb 13 oz (3.09 kg) F  EPI N LINDA      Complications: Abruptio Placenta   6 Term 18 40w3d 07:06 / 00:06 8 lb 13 oz (3.997 kg) M  EPI N LINDA      Birth Comments: induced   5 Term 16 41w0d  8 lb 1 oz (3.657 kg) F  EPI N LINDA      Birth Comments: induced   4 Term 08/15/14 39w0d  7 lb 8 oz (3.402 kg) M  EPI N LINDA      Birth Comments: epidural did not work   3 Term 13 40w0d  7 lb 9 oz (3.43 kg) M  EPI N LINDA   2 Term 11 41w0d  7 lb 9 oz (3.43 kg) M CS-Unspec Spinal N LINDA      Birth Comments: fetal distress      Complications: Fetal Intolerance   1               Past Medical History:   Diagnosis Date    Shortness of breath due to pregnancy in third trimester 2022     Past Surgical History:   Procedure Laterality Date    APPENDECTOMY       SECTION N/A 2011    TONSILLECTOMY       Social History     Occupational History    Not on file   Tobacco Use    Smoking status: Never    Smokeless tobacco: Never   Substance and Sexual Activity    Alcohol use: Not Currently    Drug use: Never    Sexual activity: Yes     Partners: Male      No family history on file.     No Known Allergies  Prior to Admission medications    Medication Sig Start Date End Date Taking? Authorizing Provider   Ferrous Sulfate (IRON PO) Take by mouth    Historical Provider, MD   Prenatal Vit-Fe Fumarate-FA (PRENATAL PO) Take by mouth    Historical Provider, MD   acetaminophen (TYLENOL) 500 MG tablet Take by mouth every 6 hours as needed    Ar Automatic Reconciliation        Review of Systems:  See above     Objective:     Vitals:    Vitals:    22 1647   BP: 135/84   Pulse: 68   Resp: 18   Temp: 98.5 °F (36.9 °C)   TempSrc: Oral   SpO2: 100%      Temp (24hrs), Av.5 °F (36.9 °C), Min:98.5 °F (36.9 °C), Max:98.5 °F (36.9 °C)    BP  Min: 135/84  Max: 148/94   1st BP in office 140/86    Physical Exam:  Pt obese. In NAD. Chest CTA  CV RRR     LE s with 3+ pitting edema to knees  Membranes:  intact  Cx 2-3/30/-2    US shows:  efw 3717, 80%, ac 89%  vtx  bpp 8/8, jhony 17  Images reviewed         Lab/Data Review:  Recent Results (from the past 24 hour(s))   CBC    Collection Time: 22  4:46 PM   Result Value Ref Range    WBC 5.4 4.3 - 11.1 K/uL    RBC 4.28 4.05 - 5.2 M/uL    Hemoglobin 10.4 (L) 11.7 - 15.4 g/dL    Hematocrit 33.3 (L) 35.8 - 46.3 %    MCV 77.8 (L) 79.6 - 97.8 FL    MCH 24.3 (L) 26.1 - 32.9 PG    MCHC 31.2 (L) 31.4 - 35.0 g/dL    RDW 16.4 (H) 11.9 - 14.6 %    Platelets 005 776 - 262 K/uL    MPV 12.3 9.4 - 12.3 FL    nRBC 0.00 0.0 - 0.2 K/uL   TYPE AND SCREEN    Collection Time: 22  4:49 PM   Result Value Ref Range    Crossmatch expiration date 2022,2359     ABO/Rh PENDING     Antibody Screen PENDING        Assessment and Plan:     Principal Problem:    Hypertension affecting pregnancy in third trimester  Active Problems:    Anemia affecting third pregnancy    Hx successful  x5(vaginal birth after ), currently pregnant    History of placenta abruption    High risk multigravida in third trimester    Obesity during pregnancy in third trimester.  BMI 49    Previous gestational diabetes mellitus, antepartum, third trimester  Resolved Problems: * No resolved hospital problems. *     GBS neg on 8-2. Most likely has at least [facility] and needs delivery. preE labs pending.

## 2022-08-18 NOTE — PROGRESS NOTES
US shows:  efw 3717, 80%, ac 89%  vtx  bpp 8/8, jhony 17  Images reviewed. Initial bp mildly elevated  Gained 6# in 3d  GFM  +mild HA- not new. Goes away with tylenol. No complaints of visual changes, abd pain  abd gravid and NT  LEs 3+ pitting edema up to knees    Repeat bp 148/94. Admit for at least GHTN. Spoke with charge nurse Jessica Elizabeth and notified Dr Mali Turner on call.

## 2022-08-18 NOTE — PROGRESS NOTES
Admission Note    Pt admitted to Room 428. Admission assessment completed. Discussed plan of care with patient. IV started, Consents witnessed. Lab work drawn, sent to lab. Reviewed \"pain goal\" with patient, explaining realistic expectations for pain relief. EFM and TOCO applied per order.

## 2022-08-19 PROBLEM — O46.90 VAGINAL BLEEDING DURING PREGNANCY: Status: RESOLVED | Noted: 2022-08-12 | Resolved: 2022-08-19

## 2022-08-19 PROBLEM — Z34.90 ENCOUNTER FOR INDUCTION OF LABOR: Status: ACTIVE | Noted: 2022-08-19

## 2022-08-19 PROBLEM — O14.93 PRE-ECLAMPSIA IN THIRD TRIMESTER: Status: ACTIVE | Noted: 2022-08-18

## 2022-08-19 PROCEDURE — 10D17ZZ EXTRACTION OF PRODUCTS OF CONCEPTION, RETAINED, VIA NATURAL OR ARTIFICIAL OPENING: ICD-10-PCS | Performed by: OBSTETRICS & GYNECOLOGY

## 2022-08-19 PROCEDURE — 7210000100 HC LABOR FEE PER 1 HR

## 2022-08-19 PROCEDURE — 62325 NJX INTERLAMINAR CRV/THRC: CPT | Performed by: ANESTHESIOLOGY

## 2022-08-19 PROCEDURE — 2500000003 HC RX 250 WO HCPCS: Performed by: OBSTETRICS & GYNECOLOGY

## 2022-08-19 PROCEDURE — 7220000101 HC DELIVERY VAGINAL/SINGLE

## 2022-08-19 PROCEDURE — 7100000010 HC PHASE II RECOVERY - FIRST 15 MIN

## 2022-08-19 PROCEDURE — 6370000000 HC RX 637 (ALT 250 FOR IP): Performed by: OBSTETRICS & GYNECOLOGY

## 2022-08-19 PROCEDURE — 51701 INSERT BLADDER CATHETER: CPT

## 2022-08-19 PROCEDURE — 2580000003 HC RX 258: Performed by: OBSTETRICS & GYNECOLOGY

## 2022-08-19 PROCEDURE — 6360000002 HC RX W HCPCS: Performed by: OBSTETRICS & GYNECOLOGY

## 2022-08-19 PROCEDURE — 6360000002 HC RX W HCPCS: Performed by: STUDENT IN AN ORGANIZED HEALTH CARE EDUCATION/TRAINING PROGRAM

## 2022-08-19 PROCEDURE — 1100000000 HC RM PRIVATE

## 2022-08-19 PROCEDURE — 10907ZC DRAINAGE OF AMNIOTIC FLUID, THERAPEUTIC FROM PRODUCTS OF CONCEPTION, VIA NATURAL OR ARTIFICIAL OPENING: ICD-10-PCS | Performed by: OBSTETRICS & GYNECOLOGY

## 2022-08-19 PROCEDURE — 7100000011 HC PHASE II RECOVERY - ADDTL 15 MIN

## 2022-08-19 RX ORDER — FUROSEMIDE 20 MG/1
20 TABLET ORAL DAILY
Status: DISCONTINUED | OUTPATIENT
Start: 2022-08-19 | End: 2022-08-20 | Stop reason: HOSPADM

## 2022-08-19 RX ORDER — FENTANYL CITRATE 50 UG/ML
INJECTION, SOLUTION INTRAMUSCULAR; INTRAVENOUS
Status: COMPLETED
Start: 2022-08-19 | End: 2022-08-19

## 2022-08-19 RX ORDER — ONDANSETRON 2 MG/ML
4 INJECTION INTRAMUSCULAR; INTRAVENOUS EVERY 6 HOURS PRN
Status: DISCONTINUED | OUTPATIENT
Start: 2022-08-19 | End: 2022-08-19

## 2022-08-19 RX ORDER — SODIUM CHLORIDE 0.9 % (FLUSH) 0.9 %
5-40 SYRINGE (ML) INJECTION EVERY 12 HOURS SCHEDULED
Status: DISCONTINUED | OUTPATIENT
Start: 2022-08-19 | End: 2022-08-20 | Stop reason: HOSPADM

## 2022-08-19 RX ORDER — MORPHINE SULFATE 4 MG/ML
2 INJECTION INTRAVENOUS
Status: DISCONTINUED | OUTPATIENT
Start: 2022-08-19 | End: 2022-08-20 | Stop reason: HOSPADM

## 2022-08-19 RX ORDER — DOCUSATE SODIUM 100 MG/1
100 CAPSULE, LIQUID FILLED ORAL 2 TIMES DAILY
Status: DISCONTINUED | OUTPATIENT
Start: 2022-08-19 | End: 2022-08-20 | Stop reason: HOSPADM

## 2022-08-19 RX ORDER — METHYLERGONOVINE MALEATE 0.2 MG/ML
200 INJECTION INTRAVENOUS
Status: ACTIVE | OUTPATIENT
Start: 2022-08-19 | End: 2022-08-19

## 2022-08-19 RX ORDER — CARBOPROST TROMETHAMINE 250 UG/ML
250 INJECTION, SOLUTION INTRAMUSCULAR PRN
Status: DISCONTINUED | OUTPATIENT
Start: 2022-08-19 | End: 2022-08-19

## 2022-08-19 RX ORDER — SODIUM CHLORIDE, SODIUM LACTATE, POTASSIUM CHLORIDE, AND CALCIUM CHLORIDE .6; .31; .03; .02 G/100ML; G/100ML; G/100ML; G/100ML
1000 INJECTION, SOLUTION INTRAVENOUS PRN
Status: DISCONTINUED | OUTPATIENT
Start: 2022-08-19 | End: 2022-08-19

## 2022-08-19 RX ORDER — MISOPROSTOL 200 UG/1
800 TABLET ORAL PRN
Status: DISCONTINUED | OUTPATIENT
Start: 2022-08-19 | End: 2022-08-19

## 2022-08-19 RX ORDER — OXYCODONE HYDROCHLORIDE 5 MG/1
5 TABLET ORAL EVERY 4 HOURS PRN
Status: DISCONTINUED | OUTPATIENT
Start: 2022-08-19 | End: 2022-08-20 | Stop reason: HOSPADM

## 2022-08-19 RX ORDER — SODIUM CHLORIDE, SODIUM LACTATE, POTASSIUM CHLORIDE, CALCIUM CHLORIDE 600; 310; 30; 20 MG/100ML; MG/100ML; MG/100ML; MG/100ML
INJECTION, SOLUTION INTRAVENOUS CONTINUOUS
Status: DISCONTINUED | OUTPATIENT
Start: 2022-08-19 | End: 2022-08-20 | Stop reason: HOSPADM

## 2022-08-19 RX ORDER — DOCUSATE SODIUM 100 MG/1
100 CAPSULE, LIQUID FILLED ORAL 2 TIMES DAILY
Status: DISCONTINUED | OUTPATIENT
Start: 2022-08-19 | End: 2022-08-19

## 2022-08-19 RX ORDER — IBUPROFEN 800 MG/1
800 TABLET ORAL EVERY 6 HOURS PRN
Status: DISCONTINUED | OUTPATIENT
Start: 2022-08-19 | End: 2022-08-20 | Stop reason: HOSPADM

## 2022-08-19 RX ORDER — SODIUM CHLORIDE 9 MG/ML
INJECTION, SOLUTION INTRAVENOUS PRN
Status: DISCONTINUED | OUTPATIENT
Start: 2022-08-19 | End: 2022-08-20 | Stop reason: HOSPADM

## 2022-08-19 RX ORDER — DEXTROSE, SODIUM CHLORIDE, SODIUM LACTATE, POTASSIUM CHLORIDE, AND CALCIUM CHLORIDE 5; .6; .31; .03; .02 G/100ML; G/100ML; G/100ML; G/100ML; G/100ML
INJECTION, SOLUTION INTRAVENOUS CONTINUOUS
Status: DISCONTINUED | OUTPATIENT
Start: 2022-08-19 | End: 2022-08-19

## 2022-08-19 RX ORDER — ACETAMINOPHEN 500 MG
1000 TABLET ORAL EVERY 8 HOURS PRN
Status: DISCONTINUED | OUTPATIENT
Start: 2022-08-19 | End: 2022-08-20 | Stop reason: HOSPADM

## 2022-08-19 RX ORDER — SODIUM CHLORIDE 0.9 % (FLUSH) 0.9 %
5-40 SYRINGE (ML) INJECTION PRN
Status: DISCONTINUED | OUTPATIENT
Start: 2022-08-19 | End: 2022-08-20 | Stop reason: HOSPADM

## 2022-08-19 RX ORDER — FENTANYL CITRATE 50 UG/ML
INJECTION, SOLUTION INTRAMUSCULAR; INTRAVENOUS PRN
Status: DISCONTINUED | OUTPATIENT
Start: 2022-08-19 | End: 2022-08-19 | Stop reason: SDUPTHER

## 2022-08-19 RX ORDER — METHYLERGONOVINE MALEATE 0.2 MG/ML
200 INJECTION INTRAVENOUS PRN
Status: DISCONTINUED | OUTPATIENT
Start: 2022-08-19 | End: 2022-08-19

## 2022-08-19 RX ORDER — DIPHENHYDRAMINE HCL 25 MG
25 CAPSULE ORAL ONCE
Status: COMPLETED | OUTPATIENT
Start: 2022-08-19 | End: 2022-08-19

## 2022-08-19 RX ORDER — MORPHINE SULFATE 4 MG/ML
4 INJECTION INTRAVENOUS
Status: DISCONTINUED | OUTPATIENT
Start: 2022-08-19 | End: 2022-08-20 | Stop reason: HOSPADM

## 2022-08-19 RX ORDER — SODIUM CHLORIDE, SODIUM LACTATE, POTASSIUM CHLORIDE, AND CALCIUM CHLORIDE .6; .31; .03; .02 G/100ML; G/100ML; G/100ML; G/100ML
500 INJECTION, SOLUTION INTRAVENOUS PRN
Status: DISCONTINUED | OUTPATIENT
Start: 2022-08-19 | End: 2022-08-19

## 2022-08-19 RX ORDER — ROPIVACAINE HYDROCHLORIDE 2 MG/ML
INJECTION, SOLUTION EPIDURAL; INFILTRATION; PERINEURAL PRN
Status: DISCONTINUED | OUTPATIENT
Start: 2022-08-19 | End: 2022-08-19 | Stop reason: SDUPTHER

## 2022-08-19 RX ORDER — LANOLIN
CREAM (ML) TOPICAL PRN
Status: DISCONTINUED | OUTPATIENT
Start: 2022-08-19 | End: 2022-08-20 | Stop reason: HOSPADM

## 2022-08-19 RX ORDER — TRANEXAMIC ACID 10 MG/ML
1000 INJECTION, SOLUTION INTRAVENOUS
Status: DISCONTINUED | OUTPATIENT
Start: 2022-08-19 | End: 2022-08-19

## 2022-08-19 RX ORDER — ONDANSETRON 8 MG/1
8 TABLET, ORALLY DISINTEGRATING ORAL EVERY 8 HOURS PRN
Status: DISCONTINUED | OUTPATIENT
Start: 2022-08-19 | End: 2022-08-20 | Stop reason: HOSPADM

## 2022-08-19 RX ADMIN — DIPHENHYDRAMINE HYDROCHLORIDE 25 MG: 25 CAPSULE ORAL at 08:37

## 2022-08-19 RX ADMIN — IBUPROFEN 800 MG: 800 TABLET ORAL at 22:01

## 2022-08-19 RX ADMIN — IBUPROFEN 800 MG: 800 TABLET ORAL at 15:09

## 2022-08-19 RX ADMIN — WITCH HAZEL 40 EACH: 500 SOLUTION RECTAL; TOPICAL at 15:09

## 2022-08-19 RX ADMIN — SODIUM CHLORIDE, SODIUM LACTATE, POTASSIUM CHLORIDE, CALCIUM CHLORIDE AND DEXTROSE MONOHYDRATE: 5; 600; 310; 30; 20 INJECTION, SOLUTION INTRAVENOUS at 06:00

## 2022-08-19 RX ADMIN — ROPIVACAINE HYDROCHLORIDE 8 ML/HR: 2 INJECTION, SOLUTION EPIDURAL; INFILTRATION; PERINEURAL at 07:35

## 2022-08-19 RX ADMIN — Medication 166.7 ML: at 12:10

## 2022-08-19 RX ADMIN — DOXYCYCLINE 200 MG: 100 INJECTION, POWDER, LYOPHILIZED, FOR SOLUTION INTRAVENOUS at 15:46

## 2022-08-19 RX ADMIN — ROPIVACAINE HYDROCHLORIDE 6 ML: 2 INJECTION, SOLUTION EPIDURAL; INFILTRATION; PERINEURAL at 07:34

## 2022-08-19 RX ADMIN — DOCUSATE SODIUM 100 MG: 100 CAPSULE ORAL at 22:00

## 2022-08-19 RX ADMIN — FENTANYL CITRATE 100 MCG: 50 INJECTION, SOLUTION INTRAMUSCULAR; INTRAVENOUS at 07:35

## 2022-08-19 RX ADMIN — SODIUM CHLORIDE, POTASSIUM CHLORIDE, SODIUM LACTATE AND CALCIUM CHLORIDE 1000 ML: 600; 310; 30; 20 INJECTION, SOLUTION INTRAVENOUS at 07:10

## 2022-08-19 RX ADMIN — Medication 2 MILLI-UNITS/MIN: at 06:00

## 2022-08-19 RX ADMIN — ACETAMINOPHEN 650 MG: 325 TABLET, FILM COATED ORAL at 06:07

## 2022-08-19 RX ADMIN — FUROSEMIDE 20 MG: 20 TABLET ORAL at 15:09

## 2022-08-19 ASSESSMENT — PAIN DESCRIPTION - ORIENTATION
ORIENTATION: LOWER
ORIENTATION: MID

## 2022-08-19 ASSESSMENT — PAIN DESCRIPTION - LOCATION
LOCATION: ABDOMEN
LOCATION: HEAD

## 2022-08-19 ASSESSMENT — PAIN SCALES - GENERAL
PAINLEVEL_OUTOF10: 3
PAINLEVEL_OUTOF10: 1

## 2022-08-19 ASSESSMENT — PAIN - FUNCTIONAL ASSESSMENT: PAIN_FUNCTIONAL_ASSESSMENT: ACTIVITIES ARE NOT PREVENTED

## 2022-08-19 ASSESSMENT — PAIN DESCRIPTION - DESCRIPTORS
DESCRIPTORS: CRAMPING;SORE
DESCRIPTORS: ACHING

## 2022-08-19 NOTE — PROGRESS NOTES
Patient up to bathroom with assistance. Cynthia-care taught and completed. Questions encouraged and answered. Patient ambulating without difficulty, encouraged to call for needs or concerns. Verbalizes understanding.

## 2022-08-19 NOTE — L&D DELIVERY NOTE
Mother's Information      Labor Events     Labor?: No  Cervical Ripening:   Now               Sky Salcido [[id number]]      Labor Events     Labor?: No   Steroids?: None  Cervical Ripening Date/Time:     Antibiotics Received during Labor?: No  Rupture Date/Time: 22 08:15:00   Rupture Type: AROM, Intact  Fluid Color: Clear  Fluid Odor: None  Fluid Volume:  Moderate  Induction: Oxytocin, AROM  Augmentation: Oxytocin  Labor Complications: None, Other (Comment)       Anesthesia    Method: Epidural       Start Pushing      Labor onset date/time:   Now     Dilation complete date/time: 22 12:00:00 EDT Now     Start pushing date/time: 2022 12:05:00   Decision date/time (emergent ):           Labor Length    2nd stage: 0h 07m  3rd stage: 0h 02m       Delivery ()      Delivery Date/Time:  22 12:07:00   Delivery Type: Vaginal, Spontaneous    Details:            [place] Presentation    Presentation: Vertex  Position: Right  _: Occiput  _: Anterior       Shoulder Dystocia    Shoulder Dystocia Present?: No  Add Second Maneuver  Add Third Maneuver  Add Fourth Maneuver  Add Fifth Maneuver  Add Sixth Maneuver  Add Seventh Maneuver  Add Eighth Maneuver  Add Ninth Maneuver       Assisted Delivery Details    Forceps Attempted?: No  Vacuum Extractor Attempted?: No       Document Additional Attempt         Document Additional Attempt                 Cord    Vessels: 3 Vessels  Complications: None  Cord Around: Right Upper Extremity  Delayed Cord Clamping?: Yes  Cord Clamped Date/Time: 2022 12:08:34  Cord Blood Disposition: Lab  Gases Sent?: No       Placenta    Date/Time: 2022 12:09:00  Removal: Curettage  Appearance: Intact  Disposition: Discarded       Lacerations    Episiotomy: None  Perineal Lacerations: None  Other Lacerations: no non-perineal laceration  Number of Repair Packets: 0       Vaginal Counts    Initial Count Personnel: SCRUB TECH  Initial Count Verified By: RN    Sponges [employer]   Initial Counts Correct Correct Correct   Final Counts Correct Correct Correct   Final Count Personnel: SCRUB TECH  Final Count Verified By: RN  Accurate Final Count?: Yes  If the count is incorrect due to Intentionally Retained Foreign Object (IRFO) add the IRFO LDA in Lines/Drains. Add LDA: Link to [facility]       Blood Loss  Mother: Dorian Lozano #[id number]     Start of Mother's Information      Delivery Blood Loss  22 0007 - 22 1306      Quantitative Blood Loss (mL) Hospital Encounter 100 grams    Total  100 mL               End of Mother's Information  Mother: Dorian Lozano #[id number]                Delivery Providers    Delivering clinician: Dmitry Wei MD     Provider Role    Dmitry Wei MD Obstetrician    Michael Bean RN Primary Nurse    Quinn Erm Scrub Tech    Jojo Richard RN Charge Nurse               Assessment    Living Status: Living     Apgar Scoring Key:    0 1 2    Skin Color: Blue or pale Acrocyanotic Completely pink    Heart Rate: Absent <100 bpm >100 bpm    Reflex Irritability: No response Grimace Cry or active withdrawal    Muscle Tone: Limp Some flexion Active motion    Respiratory Effort: Absent Weak cry; hypoventilation Good, crying                      Skin Color:   Heart Rate:   Reflex Irritability:   Muscle Tone:   Respiratory Effort:    Total:            1 Minute:    1    2    2    2    2    9        Apgar 1 total from OB History    5 Minute:    1    2    2    2    2    9        Apgar 5 total from OB History    10 Minute:              15 Minute:              20 Minute:                        Apgars Assigned By: RN              Resuscitation    Method: Bulb Suction, Stimulation              Measurements      Birth Weight: 3260 g      Head Circumference: 0.35 m Chest Circumference: 0.34 m              Title      Skin to Skin Initiation Date/Time: 22 12:15:11 EDT     Skin to Skin With: Mother     Skin to Skin End Date/Time:                         of a VMI at 1207 on 22* Apgars 9, 9  C/C/+2-->pushed to deliver head ROGE onto intact perineum. Body followed easily thereafter. Delayed cord clamping, baby to mom. Cord clamped/cut. Cord blood was drawn. Placenta delivered S/almost completely intact/3VC ---however was noted to have some small portions of small, filmy retained section of retained placenta. Banjo curette used w/ several curettages to evacuate all. All uterine wall intact. No evidence or suspicion for an accreta. No lac noted. FF w/ pit and massage. QBL per RN. Mom/baby stable. Will give dose of Doxy 200 IV x 1 for coverage given Banjo curettage.        Julian Weir MD

## 2022-08-19 NOTE — ANESTHESIA PRE PROCEDURE
Department of Anesthesiology  Preprocedure Note       Name:  Milla Staples   Age:  34 y.o.  :  1990                                          MRN:  [id number]         Date:  2022      Surgeon: * No surgeons listed *    Procedure: * No procedures listed *    Medications prior to admission:   Prior to Admission medications    Medication Sig Start Date End Date Taking?  Authorizing Provider   Ferrous Sulfate (IRON PO) Take by mouth    Historical Provider, MD   Prenatal Vit-Fe Fumarate-FA (PRENATAL PO) Take by mouth    Historical Provider, MD   acetaminophen (TYLENOL) 500 MG tablet Take by mouth every 6 hours as needed    Ar Automatic Reconciliation       Current medications:    Current Facility-Administered Medications   Medication Dose Route Frequency Provider Last Rate Last Admin    lactated ringers bolus  500 mL IntraVENous PRN Mihaela Patel MD        Or    lactated ringers bolus  1,000 mL IntraVENous PRN Mihaela Patel .9 mL/hr at 22 0710 1,000 mL at 22 0710    carboprost (HEMABATE) injection 250 mcg  250 mcg IntraMUSCular PRN Mihaela Patel MD        miSOPROStol (CYTOTEC) tablet 800 mcg  800 mcg Rectal PRN Mihaela Patel MD        tranexamic acid-NaCl IVPB premix 1,000 mg  1,000 mg IntraVENous Once PRN Mihaela Patel MD        oxytocin (PITOCIN) 30 units in 500 mL infusion  87.3 elise-units/min IntraVENous Continuous PRN Mihaela Patel MD        And    oxytocin (PITOCIN) 10 unit bolus from the bag  10 Units IntraVENous PRN Mihaela Patel MD        ondansetron [facility]) injection 4 mg  4 mg IntraVENous Q6H PRN Mihaela Patel MD        docusate sodium (COLACE) capsule 100 mg  100 mg Oral BID Mihaela Patel MD        dextrose 5 % in lactated ringers infusion   IntraVENous Continuous Mihaela Patel  mL/hr at 22 0600 New Bag at 22 0600    oxytocin (PITOCIN) 30 units in 500 mL infusion  1-20 elise-units/min IntraVENous Continuous Andrew Mace MD 4 mL/hr at 22 0703 4 elise-units/min at 22 0703    acetaminophen (TYLENOL) tablet 650 mg  650 mg Oral Q6H PRN Andrew Mace MD   650 mg at 22 7022    zolpidem (AMBIEN) tablet 5 mg  5 mg Oral Nightly PRN Andrew Mace MD         Facility-Administered Medications Ordered in Other Encounters   Medication Dose Route Frequency Provider Last Rate Last Admin    fentaNYL (SUBLIMAZE) injection   Epidural PRN Veronica Lemons, APRN - CRNA   100 mcg at 22 0735    ropivacaine (NAROPIN) 0.2% injection 0.2%   Epidural PRN Veronica Lemons, APRN - CRNA   8 mL/hr at 22 9764       Allergies:  No Known Allergies    Problem List:    Patient Active Problem List   Diagnosis Code    Hx successful  (vaginal birth after ), currently pregnant O31.200    History of placenta abruption Z87.59    High risk multigravida in third trimester O09.43    Obesity during pregnancy in third trimester O99.213    Previous gestational diabetes mellitus, antepartum, third trimester O09.293, Z86.32    Anemia affecting third pregnancy O99.019    Vaginal bleeding in pregnancy, third trimester O46.93    Pre-eclampsia in third trimester O14.93    Encounter for induction of labor Z34.90       Past Medical History:        Diagnosis Date    Pre-eclampsia in third trimester 2022    Shortness of breath due to pregnancy in third trimester 2022       Past Surgical History:        Procedure Laterality Date    APPENDECTOMY       SECTION N/A 2011    TONSILLECTOMY         Social History:    Social History     Tobacco Use    Smoking status: Never    Smokeless tobacco: Never   Substance Use Topics    Alcohol use: Not Currently                                Counseling given: Not Answered      Vital Signs (Current):   Vitals:    22 0715 22 0729 22 0731 22 0733   BP: (!) 161/90 (!) 146/77 132/72 124/74   Pulse: 71 57 75 82   Resp:  18     Temp:  98.2 °F (36.8 °C)     TempSrc:  Oral     SpO2:  100%                                                BP Readings from Last 3 Encounters:   08/19/22 124/74   08/18/22 (!) 148/94   08/15/22 132/88       NPO Status:                                                                                 BMI:   Wt Readings from Last 3 Encounters:   08/18/22 287 lb (130.2 kg)   08/15/22 281 lb (127.5 kg)   08/02/22 281 lb (127.5 kg)     There is no height or weight on file to calculate BMI.    CBC:   Lab Results   Component Value Date/Time    WBC 5.4 08/18/2022 04:46 PM    RBC 4.28 08/18/2022 04:46 PM    HGB 10.4 08/18/2022 04:46 PM    HCT 33.3 08/18/2022 04:46 PM    MCV 77.8 08/18/2022 04:46 PM    RDW 16.4 08/18/2022 04:46 PM     08/18/2022 04:46 PM       CMP:   Lab Results   Component Value Date/Time     08/11/2022 11:03 AM    K 3.9 08/11/2022 11:03 AM     08/11/2022 11:03 AM    CO2 22 08/11/2022 11:03 AM    BUN 11 08/18/2022 04:46 PM    CREATININE 0.59 08/18/2022 04:46 PM    GFRAA >60 08/11/2022 11:03 AM    AGRATIO 1.5 04/11/2022 10:20 AM    LABGLOM >60 08/11/2022 11:03 AM    GLUCOSE 74 08/11/2022 11:03 AM    GLU 79 07/22/2019 08:32 AM    PROT 6.4 08/18/2022 04:46 PM    CALCIUM 8.8 08/11/2022 11:03 AM    BILITOT 0.2 08/18/2022 04:46 PM    ALKPHOS 205 08/18/2022 04:46 PM    ALKPHOS 62 04/11/2022 10:20 AM    AST 29 08/18/2022 04:46 PM    ALT 20 08/18/2022 04:46 PM       POC Tests: No results for input(s): POCGLU, POCNA, POCK, POCCL, POCBUN, POCHEMO, POCHCT in the last 72 hours.     Coags:   Lab Results   Component Value Date/Time    PROTIME 12.5 08/11/2022 11:03 AM    INR 0.9 08/11/2022 11:03 AM    APTT 25.9 08/11/2022 11:03 AM       HCG (If Applicable): No results found for: PREGTESTUR, PREGSERUM, HCG, HCGQUANT     ABGs: No results found for: PHART, PO2ART, SBI1TPM, MID3AOU, BEART, Z7CLMKEU     Type & Screen (If Applicable):  No results found for: LABABO, LABRH    Drug/Infectious Status (If Applicable):  No results found for: HIV, HEPCAB    COVID-19 Screening (If Applicable): No results found for: COVID19        Anesthesia Evaluation  Patient summary reviewed and Nursing notes reviewed  Airway: Mallampati: II  TM distance: >3 FB   Neck ROM: full  Mouth opening: > = 3 FB   Dental:          Pulmonary:Negative Pulmonary ROS breath sounds clear to auscultation                             Cardiovascular:  Exercise tolerance: good (>4 METS),           Rhythm: regular  Rate: normal                    Neuro/Psych:   Negative Neuro/Psych ROS              GI/Hepatic/Renal:   (+) morbid obesity          Endo/Other: Negative Endo/Other ROS                     ROS comment: H/O abruption - successful VBACs since Abdominal:             Vascular: negative vascular ROS. Other Findings:           Anesthesia Plan      epidural     ASA 3             Anesthetic plan and risks discussed with patient.               Post-op pain plan if not by surgeon: epidural opioid            Christelle Ro MD   8/19/2022

## 2022-08-19 NOTE — PROGRESS NOTES
Labor Progress Note Continue Labor  Records reviewed, patient seen, fetal heart rate and contraction pattern evaluated, patient examined. Admitted for IOL due to new mild bps--P:C 0.3 ruling her in for PreEclampsia without severe features. Bps normotensive to borderline mild on no antihypertensives since admission until just now w/ a 161/90  while sitting up for an epidural --> mild on repeat 15min later. HELLP labs wnl. Denies any HA, SOB/CP, RUQ pain, vision changes. Pt desires TOLAC w/ hx 5 prior successful VBACs. Initial c/s w/ G1 was for NRFHTs. Pelvis proven 8#13. Pregnancy also c/b BMI 47+ (pregravid) --50 currently, and anemia. Patient Vitals for the past 8 hrs:   BP Temp Temp src Pulse Resp SpO2   22 0845 114/65 -- -- 62 -- --   22 0831 124/69 -- -- 72 -- --   22 0815 (!) 114/55 -- -- 57 -- --   22 0758 108/66 -- -- 61 -- --   22 0752 (!) 127/95 -- -- 81 -- --   22 0747 118/65 -- -- 68 -- --   22 0742 122/69 -- -- 65 -- --   22 0736 114/60 -- -- 73 -- --   22 0735 (!) 152/65 -- -- 69 -- --   22 0733 124/74 -- -- 82 -- --   22 0731 132/72 -- -- 75 -- --   22 0729 (!) 146/77 98.2 °F (36.8 °C) Oral 57 18 100 %   22 0715 (!) 161/90 -- -- 71 -- --   22 0401 138/85 98.4 °F (36.9 °C) Oral 79 17 97 %   22 0359 -- -- -- 74 -- 97 %          SVE:  3/50/-3, AROM clear , internals placed       TOLAC Counseling:  Discussed w/ pt possible risk of uterine rupture which would require an emergent c/s. Risks for uterine rupture w/ TOLAC are 0.5-0.9% for previous LTCS. Favoring factors for  include previous vaginal delivery, previous , favorable cervix, spontaneous labor, breech presentation as indication for previous c/s, maternal age <34yo, m aternal weight <300lbs, fetal birth weight <4,000g, and interdelivery interval >18 months.       Pt also counseled on benefits of early epidural.     Pt voices understanding and wants to proceed w/ TOLAC. Very difficult to trace baby secondary to pt's body habitus despite changing out monitors. Internal monitoring necessary. Assessment/Plan:  34 y. L.[id number] at 38w4d with PreEclampsia w/out severe features desiring TOLAC:     1)  TOLAC:  start pit to max 10 for now  2) FHTs:  IUPC/FSE placed due to inability to trace otherwise   3) GBS:  neg   4) PreE w/out severe features:   serial bps; mag if persistent severe features.   Lasix pp.  5) BMI 50: Gladys Lantigua MD

## 2022-08-19 NOTE — ANESTHESIA PROCEDURE NOTES
Epidural Block    Patient location during procedure: OB  Start time: 8/19/2022 7:20 AM  End time: 8/19/2022 7:26 AM  Reason for block: procedure for pain and labor epidural  Staffing  Anesthesiologist: Syeda Calderon MD  Epidural  Patient position: sitting  Prep: ChloraPrep  Patient monitoring: frequent blood pressure checks and cardiac monitor  Approach: midline  Location: L3-4  Injection technique: MIKAEL air and MIKAEL saline  Provider prep: mask and sterile gloves  Needle  Needle type: Tuohy   Needle gauge: 20 G  Needle length: 3.5 in  Needle insertion depth: 6 cm  Catheter type: multi-orifice  Catheter size: 19 G  Catheter at skin depth: 11 cm  Test dose: negativeCatheter Secured: tegaderm and tape  Assessment  Hemodynamics: stable  Outcomes: uncomplicated  Preanesthetic Checklist  Completed: patient identified, IV checked, risks and benefits discussed, surgical/procedural consents, equipment checked, pre-op evaluation, timeout performed, anesthesia consent given, oxygen available and monitors applied/VS acknowledged

## 2022-08-20 VITALS
SYSTOLIC BLOOD PRESSURE: 113 MMHG | TEMPERATURE: 98 F | RESPIRATION RATE: 17 BRPM | HEART RATE: 65 BPM | DIASTOLIC BLOOD PRESSURE: 67 MMHG | OXYGEN SATURATION: 98 %

## 2022-08-20 PROCEDURE — 6370000000 HC RX 637 (ALT 250 FOR IP): Performed by: OBSTETRICS & GYNECOLOGY

## 2022-08-20 RX ORDER — IBUPROFEN 800 MG/1
800 TABLET ORAL
Qty: 90 TABLET | Refills: 0 | Status: SHIPPED | OUTPATIENT
Start: 2022-08-20

## 2022-08-20 RX ORDER — FUROSEMIDE 20 MG/1
20 TABLET ORAL DAILY
Qty: 4 TABLET | Refills: 0 | Status: SHIPPED | OUTPATIENT
Start: 2022-08-20 | End: 2022-08-24

## 2022-08-20 RX ORDER — OXYCODONE HYDROCHLORIDE 5 MG/1
5 TABLET ORAL
Qty: 8 TABLET | Refills: 0 | Status: SHIPPED | OUTPATIENT
Start: 2022-08-20 | End: 2022-08-25

## 2022-08-20 RX ADMIN — IBUPROFEN 800 MG: 800 TABLET ORAL at 10:20

## 2022-08-20 RX ADMIN — FUROSEMIDE 20 MG: 20 TABLET ORAL at 10:20

## 2022-08-20 RX ADMIN — ACETAMINOPHEN 1000 MG: 500 TABLET, FILM COATED ORAL at 01:50

## 2022-08-20 RX ADMIN — DOCUSATE SODIUM 100 MG: 100 CAPSULE ORAL at 10:20

## 2022-08-20 RX ADMIN — IBUPROFEN 800 MG: 800 TABLET ORAL at 03:45

## 2022-08-20 ASSESSMENT — PAIN DESCRIPTION - ORIENTATION
ORIENTATION: LOWER
ORIENTATION: LOWER

## 2022-08-20 ASSESSMENT — PAIN SCALES - GENERAL
PAINLEVEL_OUTOF10: 5
PAINLEVEL_OUTOF10: 3

## 2022-08-20 ASSESSMENT — PAIN DESCRIPTION - DESCRIPTORS
DESCRIPTORS: CRAMPING;SORE
DESCRIPTORS: CRAMPING

## 2022-08-20 ASSESSMENT — PAIN DESCRIPTION - LOCATION
LOCATION: ABDOMEN
LOCATION: ABDOMEN

## 2022-08-20 NOTE — DISCHARGE SUMMARY
Discharge Summary:     Date of Admission:  2022  4:19 PM  Date of Discharge:  2022      Patient is a 34 y.o. [id number] at 38w4d wks who was admitted for IOL for TOLAC due to PreEclampsia without severe features. She had a successful  and a normal PP course other than necessitating banjo curettage immediately pp due to some retained small portion of placenta. Patient remained afebrile throughout the entire hospital stay. Bps mostly normotensive entire hospital course on no antihypertensives--occasional mild range. HELLP labs wnl; P:C ratio 0.3. Pt asx from a preE standpoint. Continue w/ Lasix 20 x 5 days pp. Great UOP. Pt desired early DC home and was discharged home in good condition, meeting all postop goals with routine postpartum instructions. Pt to follow up in 6  weeks for pp visit at [facility]. Discharge Meds:       Medication List        START taking these medications      furosemide 20 MG tablet  Commonly known as: LASIX  Take 1 tablet by mouth daily for 4 days     ibuprofen 800 MG tablet  Commonly known as: ADVIL;MOTRIN  Take 1 tablet by mouth every 6-8 hours as needed for Pain     oxyCODONE 5 MG immediate release tablet  Commonly known as: ROXICODONE  Take 1 tablet by mouth every 4-6 hours as needed for Pain for up to 5 days. CONTINUE taking these medications      acetaminophen 500 MG tablet  Commonly known as: TYLENOL     IRON PO     PRENATAL PO               Where to Get Your Medications        These medications [id number] [address] [facility]      Phone: [phone]   furosemide 20 MG tablet  ibuprofen 800 MG tablet  oxyCODONE 5 MG immediate release tablet         No problem-specific Assessment & Plan notes found for this encounter.        Salazar Truong MD  8:47 AM  22

## 2022-08-20 NOTE — DISCHARGE INSTRUCTIONS
Vaginal Childbirth: Care Instructions  Overview     Vaginal birth means delivering a baby through the birth canal (vagina). During labor, the uterus tightens (contracts) regularly to thin and open the cervixand to push the baby out through the birth canal.  Your body will slowly heal in the next few weeks. It's easy to get too tired and overwhelmed during the first weeks after your baby is born. Changes in your hormones can shift your mood without warning. You may find it hard to meet theextra demands on your energy and time. Take it easy on yourself. Follow-up care is a key part of your treatment and safety. Be sure to make and go to all appointments, and call your doctor if you are having problems. It's also a good idea to know your test results and keep alist of the medicines you take. How can you care for yourself at home? Vaginal bleeding and cramps  After delivery, you will have a bloody discharge from your vagina. This will turn pink within a week and then white or yellow after about 10 days. It may last for 2 to 4 weeks or longer, until the uterus has healed. Use sanitary pads until you stop bleeding. Using pads makes it easier to monitor your bleeding. Don't worry if you pass some blood clots, as long as they are smaller than a golf ball. If you have a tear or stitches in your vaginal area, change the pad at least every 4 hours. This will help prevent soreness and infection. You may have cramps for the first few days after childbirth. These are normal and occur as the uterus shrinks to normal size. Take an over-the-counter pain medicine, such as acetaminophen (Tylenol), ibuprofen (Advil, Motrin), or naproxen (Aleve), for cramps. Read and follow all instructions on the label. Do not take aspirin, because it can cause more bleeding. Do not take two or more pain medicines at the same time unless the doctor told you to. Many pain medicines have acetaminophen, which is Tylenol.  Too much acetaminophen (Tylenol) can be harmful. Stitches  If you have stitches, they will dissolve on their own and don't need to be removed. Follow your doctor's instructions for cleaning the stitched area. Put ice or a cold pack on your painful area for 10 to 20 minutes at a time, several times a day, for the first few days. Put a thin cloth between the ice and your skin. Sit in a few inches of warm water (sitz bath) 3 times a day and after bowel movements. The warm water helps with pain and itching. If you don't have a tub, a warm shower might help. Breast fullness  Your breasts may overfill (engorge) in the first few days after nursing, don't put warmth on your breasts or touch your breasts. Wear a bra that fits well and use ice until the fullness goes away. This usually takes 2 to 3 days. Put ice or a cold pack on your breast after nursing to reduce swelling and pain. Put a thin cloth between the ice and your skin. Activity  Eat a balanced diet. Don't try to lose weight by cutting calories. Keep taking your prenatal vitamins, or take a multivitamin. Get as much rest as you can. Try to take naps when your baby sleeps during the day. Get some exercise every day. But don't do any heavy exercise until your doctor says it is okay. Wait until you are healed (about 4 to 6 weeks) before you have sexual intercourse. Your doctor will tell you when it is okay to have sex. If you don't want to get pregnant, talk to your doctor about birth control. You can get pregnant even before your period returns. Also, you can get pregnant while you are breastfeeding. Mental health  It's normal to have some sadness, anxiety, sleeplessness, and mood swings after you go home. If you feel upset or hopeless for more than a few days or are having trouble doing the things you need to do, talk to your doctor. Constipation and hemorrhoids  Drink plenty of fluids.  If you have kidney, heart, or liver disease and have to limit fluids, talk with your doctor before you increase the amount of fluids you drink. Eat plenty of fiber each day. Have a bran muffin or bran cereal for breakfast. Try eating a piece of fruit for a mid-afternoon snack. For painful, itchy hemorrhoids, put ice or a cold pack on the area several times a day for 10 minutes at a time. Follow this by putting a warm compress on the area for another 10 to 20 minutes or by sitting in a shallow, warm bath. When should you call for help? Share this information with your partner, family, or a friend. They can help you watch for warning signs. Call 911 anytime you think you may need emergency care. For example, call if:    You have thoughts of harming yourself, your baby, or another person. You passed out (lost consciousness). You have chest pain, are short of breath, or cough up blood. You have a seizure. Call your doctor now or seek immediate medical care if:    You have signs of hemorrhage (too much bleeding), such as:  Heavy vaginal bleeding. This means that you are soaking through one or more pads in an hour. Or you pass blood clots bigger than an egg. Feeling dizzy or lightheaded, or you feel like you may faint. Feeling so tired or weak that you cannot do your usual activities. A fast or irregular heartbeat. New or worse belly pain. You have signs of infection, such as:  A fever. Vaginal discharge that smells bad. New or worse belly pain. You have symptoms of a blood clot in your leg (called a deep vein thrombosis), such as:  Pain in the calf, back of the knee, thigh, or groin. Redness and swelling in your leg or groin. You have signs of preeclampsia, such as:  Sudden swelling of your face, hands, or feet. New vision problems (such as dimness, blurring, or seeing spots). A severe headache. Watch closely for changes in your health, and be sure to contact your doctor if:    Your vaginal bleeding isn't decreasing.      You feel sad, anxious, or hopeless for more than a few days. You are having problems with your breasts or breastfeeding. Where can you learn more? Go to https://chpepiceweb.[facility]. org and sign in to your [employer] account. Enter A873 in the [employer] box to learn more about \"Vaginal Childbirth: Care Instructions. \"     If you do not have an account, please click on the \"Sign Up Now\" link. Current as of: February 23, 2022               Content Version: 13.3  © 2006-2022 Healthwise, Incorporated. Care instructions adapted under license by [facility] ([facility]). If you have questions about a medical condition or this instruction, always ask your healthcare professional. [address] any warranty or liability for your use of this information.

## 2022-08-20 NOTE — LACTATION NOTE

## 2022-08-20 NOTE — LACTATION NOTE

## 2022-08-20 NOTE — CARE COORDINATION
Chart reviewed by JEANNE for discharge needs, none identified. SW spoke with the patient to provide psychoeducation on PPD and provided her with a packet of resources.      Panda Wilson LMSW    [address] [place] [address]

## 2022-08-20 NOTE — PROGRESS NOTES
Progress Note                               Patient: Tammy Lyn MRN: [id number]  SSN: [id number]    YOB: 1990  Age: 34 y.o. Sex: female      Postpartum Day Number 1    Subjective:     Patient doing well postpartum without significant complaints. Voiding without difficulty. Patient reports normal lochia. Pain well controlled, voiding on her own without difficulty. Denies HA, SOB/CP, RUQ pain, Vision changes. Pt is considering early DC home today if ok w/ peds. Objective:     Patient Vitals for the past 12 hrs:   Temp Pulse Resp BP SpO2   22 0737 98.1 °F (36.7 °C) 62 17 111/63 98 %   22 0312 98.1 °F (36.7 °C) 66 16 115/71 98 %   22 2352 98.2 °F (36.8 °C) 74 17 127/79 98 %   22 2023 99.1 °F (37.3 °C) 83 25 133/85 98 %       Temp (24hrs), Av.3 °F (36.8 °C), Min:98 °F (36.7 °C), Max:99.1 °F (37.3 °C)      Intake/Output Summary (Last 24 hours) at 2022 0814  Last data filed at 2022 0447  Gross per 24 hour   Intake 3448.33 ml   Output 3750 ml   Net -301.67 ml           Physical Exam:    Constitutional: She appears well-developed and well-nourished. No distress. HENT:    Head: Normocephalic and atraumatic.    Cardiovascular: RRR  Pulmonary/Chest: CTAB  Abd:  S/NTTP/ND, BS normoactive, fundus firm at umbilicus  Ext:  No c/c/e 1+      Lab/Data Review:  Recent Results (from the past 72 hour(s))   CBC    Collection Time: 22  4:46 PM   Result Value Ref Range    WBC 5.4 4.3 - 11.1 K/uL    RBC 4.28 4.05 - 5.2 M/uL    Hemoglobin 10.4 (L) 11.7 - 15.4 g/dL    Hematocrit 33.3 (L) 35.8 - 46.3 %    MCV 77.8 (L) 79.6 - 97.8 FL    MCH 24.3 (L) 26.1 - 32.9 PG    MCHC 31.2 (L) 31.4 - 35.0 g/dL    RDW 16.4 (H) 11.9 - 14.6 %    Platelets 457 458 - 419 K/uL    MPV 12.3 9.4 - 12.3 FL    nRBC 0.00 0.0 - 0.2 K/uL   BUN    Collection Time: 22  4:46 PM   Result Value Ref Range    BUN 11 6 - 23 MG/DL   Creatinine, Serum    Collection Time: 22  4:46 PM   Result Value Ref Range    Creatinine 0.59 (L) 0.6 - 1.0 MG/DL   Hepatic Function Panel    Collection Time: 22  4:46 PM   Result Value Ref Range    Total Protein 6.4 6.3 - 8.2 g/dL    Albumin 2.6 (L) 3.5 - 5.0 g/dL    Globulin 3.8 (H) 2.3 - 3.5 g/dL    Albumin/Globulin Ratio 0.7 (L) 1.2 - 3.5      Total Bilirubin 0.2 0.2 - 1.1 MG/DL    Bilirubin, Direct <0.1 <0.4 MG/DL    Alk Phosphatase 205 (H) 50 - 136 U/L    AST 29 15 - 37 U/L    ALT 20 12 - 65 U/L   Protein / creatinine ratio, urine    Collection Time: 22  4:46 PM   Result Value Ref Range    Protein, Urine, Random 13 mg/dL    Creatinine, Ur 45.00 mg/dL    PROTEIN/CREAT RATIO URINE RAN 0.3     Uric Acid    Collection Time: 22  4:46 PM   Result Value Ref Range    Uric Acid 4.8 2.6 - 6.0 MG/DL   TYPE AND SCREEN    Collection Time: 22  4:49 PM   Result Value Ref Range    Crossmatch expiration date 2022,2359     ABO/Rh A POSITIVE     Antibody Screen NEG          Information for the patient's :  Rj Maldonado [[id number]]     Lab Results   Component Value Date/Time    ABORH A POSITIVE 2022 12:07 PM           Assessment and Plan:     34 y.o. [id number] ppd# 1 s/p  s/p IOL due to PreEclampsia without severe ftrs:    1) PP:  Meeting all pp goals, continue routine care;  appropriate for early DC home today if ok w/ peds    2) Breast  feeding, Rh pos , Rub imm     3) PreE: bps mostly normotensive on no antihypertensives--occasional mild range. HELLP labs wnl; P:C ratio 0.3. Pt asx from a preE standpoint  Lasix 20 x 5 days. Great UOP. 4) BMI 50:  ambulation encouraged; scds in bed     5) Small retained portion of placenta:  s/p Banjo curettage at bedside s/p delivery; s/p Doxy IV x1. Remains afebrile.        Signed By: Karlee De La Rosa MD     2022

## 2022-08-20 NOTE — ANESTHESIA POSTPROCEDURE EVALUATION
Department of Anesthesiology  Postprocedure Note    Patient: Melissa Feliz  MRN: [id number]  YOB: 1990  Date of evaluation: 8/20/2022      Procedure Summary     Date: 08/19/22 Room / Location:     Anesthesia Start: 0712 Anesthesia Stop: 1207    Procedure: Labor Analgesia Diagnosis:     Scheduled Providers:  Responsible Provider: Drew Weeks MD    Anesthesia Type: epidural ASA Status: 3          Anesthesia Type: No value filed. Judith Phase I:      Judith Phase II:        Anesthesia Post Evaluation    Patient location during evaluation: floor  Patient participation: complete - patient participated  Level of consciousness: awake and alert  Airway patency: patent  Nausea & Vomiting: no nausea and no vomiting  Complications: no  Cardiovascular status: hemodynamically stable  Respiratory status: acceptable  Hydration status: euvolemic  Comments: Motor/sensory function has returned to lower extremities. Patient appears satisfied with neuraxial analgesia.   Multimodal analgesia pain management approach

## 2022-08-25 ENCOUNTER — HOSPITAL ENCOUNTER (EMERGENCY)
Age: 32
Discharge: HOME | End: 2022-08-25
Attending: EMERGENCY MEDICINE
Payer: COMMERCIAL

## 2022-08-25 ENCOUNTER — APPOINTMENT (OUTPATIENT)
Dept: ULTRASOUND IMAGING | Age: 32
End: 2022-08-25
Payer: COMMERCIAL

## 2022-08-25 ENCOUNTER — HOSPITAL ENCOUNTER (EMERGENCY)
Dept: ULTRASOUND IMAGING | Age: 32
Discharge: HOME | End: 2022-08-28
Payer: COMMERCIAL

## 2022-08-25 VITALS
HEART RATE: 75 BPM | DIASTOLIC BLOOD PRESSURE: 76 MMHG | WEIGHT: 287 LBS | TEMPERATURE: 98.7 F | BODY MASS INDEX: 49 KG/M2 | SYSTOLIC BLOOD PRESSURE: 146 MMHG | OXYGEN SATURATION: 99 % | RESPIRATION RATE: 16 BRPM | HEIGHT: 64 IN

## 2022-08-25 DIAGNOSIS — M79.605 PAIN OF LEFT LOWER EXTREMITY: Primary | ICD-10-CM

## 2022-08-25 DIAGNOSIS — I80.02 THROMBOPHLEBITIS OF SUPERFICIAL VEINS OF LEFT LOWER EXTREMITY: ICD-10-CM

## 2022-08-25 DIAGNOSIS — R10.11 RIGHT UPPER QUADRANT ABDOMINAL PAIN: ICD-10-CM

## 2022-08-25 LAB
ALBUMIN SERPL-MCNC: 2.9 G/DL (ref 3.5–5)
ALBUMIN/GLOB SERPL: 0.7 {RATIO} (ref 1.2–3.5)
ALP SERPL-CCNC: 141 U/L (ref 50–136)
ALT SERPL-CCNC: 43 U/L (ref 12–65)
ANION GAP SERPL CALC-SCNC: 7 MMOL/L (ref 7–16)
APPEARANCE UR: CLEAR
AST SERPL-CCNC: 25 U/L (ref 15–37)
BACTERIA URNS QL MICRO: NEGATIVE /HPF
BASOPHILS # BLD: 0 K/UL (ref 0–0.2)
BASOPHILS NFR BLD: 0 % (ref 0–2)
BILIRUB SERPL-MCNC: 0.3 MG/DL (ref 0.2–1.1)
BILIRUB UR QL: NEGATIVE
BUN SERPL-MCNC: 14 MG/DL (ref 6–23)
CALCIUM SERPL-MCNC: 9.3 MG/DL (ref 8.3–10.4)
CASTS URNS QL MICRO: ABNORMAL /LPF
CHLORIDE SERPL-SCNC: 107 MMOL/L (ref 98–107)
CO2 SERPL-SCNC: 26 MMOL/L (ref 21–32)
COLOR UR: ABNORMAL
CREAT SERPL-MCNC: 0.72 MG/DL (ref 0.6–1)
DIFFERENTIAL METHOD BLD: ABNORMAL
EOSINOPHIL # BLD: 0.1 K/UL (ref 0–0.8)
EOSINOPHIL NFR BLD: 2 % (ref 0.5–7.8)
EPI CELLS #/AREA URNS HPF: ABNORMAL /HPF
ERYTHROCYTE [DISTWIDTH] IN BLOOD BY AUTOMATED COUNT: 16.8 % (ref 11.9–14.6)
GLOBULIN SER CALC-MCNC: 4.1 G/DL (ref 2.3–3.5)
GLUCOSE SERPL-MCNC: 104 MG/DL (ref 65–100)
GLUCOSE UR STRIP.AUTO-MCNC: NEGATIVE MG/DL
HCT VFR BLD AUTO: 38 % (ref 35.8–46.3)
HGB BLD-MCNC: 11.6 G/DL (ref 11.7–15.4)
HGB UR QL STRIP: ABNORMAL
IMM GRANULOCYTES # BLD AUTO: 0 K/UL (ref 0–0.5)
IMM GRANULOCYTES NFR BLD AUTO: 0 % (ref 0–5)
KETONES UR QL STRIP.AUTO: NEGATIVE MG/DL
LEUKOCYTE ESTERASE UR QL STRIP.AUTO: ABNORMAL
LIPASE SERPL-CCNC: 97 U/L (ref 73–393)
LYMPHOCYTES # BLD: 2.1 K/UL (ref 0.5–4.6)
LYMPHOCYTES NFR BLD: 28 % (ref 13–44)
MCH RBC QN AUTO: 24.5 PG (ref 26.1–32.9)
MCHC RBC AUTO-ENTMCNC: 30.5 G/DL (ref 31.4–35)
MCV RBC AUTO: 80.2 FL (ref 79.6–97.8)
MONOCYTES # BLD: 0.4 K/UL (ref 0.1–1.3)
MONOCYTES NFR BLD: 5 % (ref 4–12)
NEUTS SEG # BLD: 4.7 K/UL (ref 1.7–8.2)
NEUTS SEG NFR BLD: 64 % (ref 43–78)
NITRITE UR QL STRIP.AUTO: NEGATIVE
NRBC # BLD: 0 K/UL (ref 0–0.2)
PH UR STRIP: 6 [PH] (ref 5–9)
PLATELET # BLD AUTO: 272 K/UL (ref 150–450)
PMV BLD AUTO: 11.3 FL (ref 9.4–12.3)
POTASSIUM SERPL-SCNC: 3.8 MMOL/L (ref 3.5–5.1)
PROT SERPL-MCNC: 7 G/DL (ref 6.3–8.2)
PROT UR STRIP-MCNC: ABNORMAL MG/DL
RBC # BLD AUTO: 4.74 M/UL (ref 4.05–5.2)
RBC #/AREA URNS HPF: ABNORMAL /HPF
SODIUM SERPL-SCNC: 140 MMOL/L (ref 136–145)
SP GR UR REFRACTOMETRY: 1.02 (ref 1–1.02)
UROBILINOGEN UR QL STRIP.AUTO: 0.2 EU/DL (ref 0.2–1)
WBC # BLD AUTO: 7.3 K/UL (ref 4.3–11.1)
WBC URNS QL MICRO: ABNORMAL /HPF

## 2022-08-25 PROCEDURE — 83690 ASSAY OF LIPASE: CPT

## 2022-08-25 PROCEDURE — 6360000002 HC RX W HCPCS: Performed by: PHYSICIAN ASSISTANT

## 2022-08-25 PROCEDURE — 96374 THER/PROPH/DIAG INJ IV PUSH: CPT

## 2022-08-25 PROCEDURE — 99284 EMERGENCY DEPT VISIT MOD MDM: CPT

## 2022-08-25 PROCEDURE — 81001 URINALYSIS AUTO W/SCOPE: CPT

## 2022-08-25 PROCEDURE — 93971 EXTREMITY STUDY: CPT

## 2022-08-25 PROCEDURE — 76705 ECHO EXAM OF ABDOMEN: CPT

## 2022-08-25 PROCEDURE — 85025 COMPLETE CBC W/AUTO DIFF WBC: CPT

## 2022-08-25 PROCEDURE — 80053 COMPREHEN METABOLIC PANEL: CPT

## 2022-08-25 RX ORDER — KETOROLAC TROMETHAMINE 15 MG/ML
15 INJECTION, SOLUTION INTRAMUSCULAR; INTRAVENOUS
Status: COMPLETED | OUTPATIENT
Start: 2022-08-25 | End: 2022-08-25

## 2022-08-25 RX ADMIN — KETOROLAC TROMETHAMINE 15 MG: 15 INJECTION, SOLUTION INTRAMUSCULAR; INTRAVENOUS at 14:58

## 2022-08-25 ASSESSMENT — PAIN DESCRIPTION - FREQUENCY
FREQUENCY: CONTINUOUS
FREQUENCY: CONTINUOUS

## 2022-08-25 ASSESSMENT — ENCOUNTER SYMPTOMS
BACK PAIN: 0
EYE REDNESS: 0
VOMITING: 0
DIARRHEA: 0
CONSTIPATION: 0
ABDOMINAL PAIN: 1
ABDOMINAL DISTENTION: 0
CHEST TIGHTNESS: 0
SORE THROAT: 0
RHINORRHEA: 0
COUGH: 0
SHORTNESS OF BREATH: 0
NAUSEA: 1

## 2022-08-25 ASSESSMENT — PAIN DESCRIPTION - LOCATION
LOCATION: ABDOMEN
LOCATION: ABDOMEN

## 2022-08-25 ASSESSMENT — PAIN DESCRIPTION - PAIN TYPE
TYPE: ACUTE PAIN
TYPE: ACUTE PAIN

## 2022-08-25 ASSESSMENT — PAIN SCALES - GENERAL
PAINLEVEL_OUTOF10: 1
PAINLEVEL_OUTOF10: 6
PAINLEVEL_OUTOF10: 6
PAINLEVEL_OUTOF10: 4

## 2022-08-25 ASSESSMENT — PAIN DESCRIPTION - ORIENTATION
ORIENTATION: RIGHT;UPPER
ORIENTATION: RIGHT;UPPER

## 2022-08-25 ASSESSMENT — PAIN - FUNCTIONAL ASSESSMENT
PAIN_FUNCTIONAL_ASSESSMENT: ACTIVITIES ARE NOT PREVENTED
PAIN_FUNCTIONAL_ASSESSMENT: 0-10

## 2022-08-25 ASSESSMENT — PAIN DESCRIPTION - DESCRIPTORS
DESCRIPTORS: ACHING
DESCRIPTORS: ACHING

## 2022-08-25 ASSESSMENT — PAIN DESCRIPTION - ONSET: ONSET: SUDDEN

## 2022-08-25 ASSESSMENT — PAIN DESCRIPTION - DIRECTION: RADIATING_TOWARDS: BACK

## 2022-08-25 NOTE — ED NOTES
I have reviewed discharge instructions with the patient. The patient verbalized understanding. Patient left ED via Discharge Method: ambulatory to Home with self  Opportunity for questions and clarification provided. Patient given 0 scripts. To continue your aftercare when you leave the hospital, you may receive an automated call from our care team to check in on how you are doing. This is a free service and part of our promise to provide the best care and service to meet your aftercare needs.  If you have questions, or wish to unsubscribe from this service please call [phone]. Thank you for Choosing our [address] [place] [address] Emergency Department.         Dillan Izaguirre RN  08/25/22 2390

## 2022-08-25 NOTE — ED PROVIDER NOTES
[employer] Emergency Department Provider Note                   PCP:                SOLOMON Sousa NP               Age: 34 y.o. Sex: female       ICD-10-CM    1. Pain of left lower extremity  M79.605 Vascular duplex lower extremity venous left     Vascular duplex lower extremity venous left      2. Thrombophlebitis of superficial veins of left lower extremity  I80.02       3. Right upper quadrant abdominal pain  R10.11           DISPOSITION Decision To Discharge 08/25/2022 03:20:42 PM        MDM  Number of Diagnoses or Management Options  Pain of left lower extremity  Right upper quadrant abdominal pain  Thrombophlebitis of superficial veins of left lower extremity  Diagnosis management comments: Patient is a 67-year-old female who is 6 days postpartum from [facility] presenting with complaint of right upper quadrant pain and painful red swollen vein in the left upper leg. She is afebrile, nontoxic in appearance, blood pressure 128/86, vitals within normal limits. Mild discomfort with palpation of the right upper quadrant. Will obtain labs CBC, CMP, lipase and urinalysis. We will get ultrasound of the right upper quadrant as well as ultrasound duplex of left lower leg.     Labs Reviewed  CBC WITH AUTO DIFFERENTIAL - Abnormal; Notable for the following components:     Hemoglobin                    11.6 (*)               MCH                           24.5 (*)               MCHC                          30.5 (*)               RDW                           16.8 (*)            All other components within normal limits  COMPREHENSIVE METABOLIC PANEL - Abnormal; Notable for the following components:     Glucose                       104 (*)                Alk Phosphatase               141 (*)                Albumin                       2.9 (*)                Globulin                      4.1 (*)                Albumin/Globulin Ratio        0.7 (*)             All other components within normal limits  URINALYSIS - Abnormal; Notable for the following components:     Protein, UA                   TRACE (*)               Blood, Urine                  LARGE (*)               Leukocyte Esterase, Urine     SMALL (*)            All other components within normal limits  LIPASE    US abd: No acute abdominal findings. Venous Duplex: 1. No DVT in the deep veins of the left leg. 2. Thrombosed superficial varicose vein in the left upper thigh    All results discussed at length with patient, advised to continue anti-inflammatories and warm compresses for superficial thrombophlebitis. Advised that she follow-up closely with her doctor within 1 week for reevaluation of her abdominal pain. Discussed reasons to return to the ER. Patient verbalized understanding and is agreeable to plan. Orders Placed This Encounter   Procedures    US ABDOMEN LIMITED Specify organ? GALLBLADDER    CBC with Diff    CMP    Lipase    Urinalysis w rflx microscopic    POCT Urine Dipstick    Saline lock IV    Vascular duplex lower extremity venous left        Jeremy wAan is a 34 y.o. female who presents to the Emergency Department with chief complaint of    Chief Complaint   Patient presents with    Abdominal Pain      Is a 76-year-old female who is 6 days postpartum with complaint of right upper quadrant pain and left leg pain. She was induced secondary to preeclampsia on 2022 and had  later than day, was discharged home the following day. She notes that she has had nausea intermittently and gradually worsening pain in the epigastric and right upper quadrant region. She states that sometimes it is worse with all eating but not always. She denies any fevers or chills. She denies any chest pain or shortness of breath. She does note that she has a red painful vein on the left upper leg but denies any lower extremity swelling.   She did go to her primary doctor and was sent here because her blood pressure was elevated at their office. She denies any previous gestational hypertension or diabetes. States that her blood pressure was not elevated until the day that they induced her. She is currently breast-feeding. The history is provided by the patient. Review of Systems   Constitutional:  Negative for activity change, appetite change, chills, fatigue and fever. HENT:  Negative for congestion, ear pain, rhinorrhea and sore throat. Eyes:  Negative for redness. Respiratory:  Negative for cough, chest tightness and shortness of breath. Cardiovascular:  Negative for chest pain. Gastrointestinal:  Positive for abdominal pain (RUQ) and nausea. Negative for abdominal distention, constipation, diarrhea and vomiting. Genitourinary:  Negative for dysuria and hematuria. Musculoskeletal:  Negative for back pain and neck pain. Skin:  Negative for rash. Neurological:  Negative for light-headedness and headaches. Psychiatric/Behavioral:  Negative for confusion. Past Medical History:   Diagnosis Date    Pre-eclampsia in third trimester 2022    Shortness of breath due to pregnancy in third trimester 2022        Past Surgical History:   Procedure Laterality Date    APPENDECTOMY       SECTION N/A     TONSILLECTOMY          History reviewed. No pertinent family history. Social History     Socioeconomic History    Marital status:      Spouse name: None    Number of children: None    Years of education: None    Highest education level: None   Tobacco Use    Smoking status: Never    Smokeless tobacco: Never   Substance and Sexual Activity    Alcohol use: Not Currently    Drug use: Never    Sexual activity: Yes     Partners: Male         Patient has no known allergies.      Discharge Medication List as of 2022  3:42 PM        CONTINUE these medications which have NOT CHANGED    Details   furosemide (LASIX) 20 MG tablet Take 1 tablet by mouth daily for 4 days, Disp-4 tablet, R-0Normal      ibuprofen (ADVIL;MOTRIN) 800 MG tablet Take 1 tablet by mouth every 6-8 hours as needed for Pain, Disp-90 tablet, R-0Normal      oxyCODONE (ROXICODONE) 5 MG immediate release tablet Take 1 tablet by mouth every 4-6 hours as needed for Pain for up to 5 days. , Disp-8 tablet, R-0Normal      Ferrous Sulfate (IRON PO) Take by mouthHistorical Med      Prenatal Vit-Fe Fumarate-FA (PRENATAL PO) Take by mouthHistorical Med      acetaminophen (TYLENOL) 500 MG tablet Take by mouth every 6 hours as neededHistorical Med              Vitals signs and nursing note reviewed. No data found. Physical Exam  Vitals and nursing note reviewed. Constitutional:       General: She is not in acute distress. Appearance: She is not ill-appearing or toxic-appearing. HENT:      Head: Normocephalic and atraumatic. Cardiovascular:      Rate and Rhythm: Normal rate. Heart sounds: Normal heart sounds. Pulmonary:      Effort: Pulmonary effort is normal.      Breath sounds: Normal breath sounds. Abdominal:      General: There is no distension. Palpations: Abdomen is soft. Tenderness: There is abdominal tenderness in the right upper quadrant. There is no right CVA tenderness, left CVA tenderness, guarding or rebound. Skin:     General: Skin is warm and dry. Neurological:      Mental Status: She is alert and oriented to person, place, and time.    Psychiatric:         Mood and Affect: Mood normal.         Behavior: Behavior normal.        Procedures      Labs Reviewed   CBC WITH AUTO DIFFERENTIAL - Abnormal; Notable for the following components:       Result Value    Hemoglobin 11.6 (*)     MCH 24.5 (*)     MCHC 30.5 (*)     RDW 16.8 (*)     All other components within normal limits   COMPREHENSIVE METABOLIC PANEL - Abnormal; Notable for the following components:    Glucose 104 (*)     Alk Phosphatase 141 (*)     Albumin 2.9 (*)     Globulin 4.1 (*)     Albumin/Globulin Ratio 0.7 (*) All other components within normal limits   URINALYSIS - Abnormal; Notable for the following components:    Protein, UA TRACE (*)     Blood, Urine LARGE (*)     Leukocyte Esterase, Urine SMALL (*)     All other components within normal limits   LIPASE        US ABDOMEN LIMITED Specify organ? GALLBLADDER   Final Result   No acute abdominal findings. Vascular duplex lower extremity venous left   Final Result      1. No DVT in the deep veins of the left leg. 2. Thrombosed superficial varicose vein in the left upper thigh. Voice dictation software was used during the making of this note. This software is not perfect and grammatical and other typographical errors may be present. This note has not been completely proofread for errors.      Jalen Delgadoma  08/25/22 [id number]

## 2022-08-25 NOTE — ED TRIAGE NOTES
Pt presents to the ED for numerous c/o's. Vaginal delivery last week. Now, having upper right quadrant abd pain that radiates into her upper right back with nausea, elevated BP, protein in her urine and a red, raised area on her left upper thigh area.  Masked on arrival.

## 2022-10-05 ENCOUNTER — POSTPARTUM VISIT (OUTPATIENT)
Dept: OBGYN CLINIC | Age: 32
End: 2022-10-05

## 2022-10-05 VITALS — SYSTOLIC BLOOD PRESSURE: 124 MMHG | DIASTOLIC BLOOD PRESSURE: 78 MMHG | BODY MASS INDEX: 44.29 KG/M2 | WEIGHT: 258 LBS

## 2022-10-05 DIAGNOSIS — O14.93 PRE-ECLAMPSIA IN THIRD TRIMESTER: ICD-10-CM

## 2022-10-05 DIAGNOSIS — Z30.09 ENCOUNTER FOR COUNSELING REGARDING CONTRACEPTION: ICD-10-CM

## 2022-10-05 PROCEDURE — 99902 PR PRENATAL VISIT: CPT | Performed by: OBSTETRICS & GYNECOLOGY

## 2022-10-05 RX ORDER — LISINOPRIL 20 MG/1
TABLET ORAL
COMMUNITY
Start: 2022-09-28

## 2022-10-05 RX ORDER — HYDROCHLOROTHIAZIDE 25 MG/1
TABLET ORAL
COMMUNITY
Start: 2022-09-24

## 2022-10-05 NOTE — PROGRESS NOTES
CC:  6wk pp visit         HPI:  Henderson Bloch  is a 34 y.o. [id number] presenting today for her 6wk post-partum visit after  on 22 at 38w3d with me after IOL due to new mild bps w/ P:C ratio 0.3-- ruling her in for PreEclampsia without Severe Features. HELLP labs wnl. She was Dc'd home on PPD#1 per pt request w/ mostly all normotensive bps on no antihypertensives, only Lasix x 5 days. Pregnancy complicated by prior c/s x 1, BMI 47+ (pregravid) --50 currently, prior hx of placental abruption, hx of GDM , and anemia. She has since been started on HCTZ and Lisinopril by her NP Gurjit Hernandez. Denies any complaints today. Tolerating a regular diet w/out any nausea/vomiting. Voiding w/out issue. Denies diarrhea/constipation or pp fevers. breast feeding--denies any breast issues (erythema, rashes, pain, engorgement, abscesses, etc). Has not yet had sex since her delivery. VB has ceased. She has not yet started any form of contraception.        Denies any issues w/ PP Depression/Anxiety            Delivery Method: Vaginal delivery  Delivery Laceration:  no   Status of Baby: Disposition of baby: home         Past Medical History:   Diagnosis Date    Pre-eclampsia in third trimester 2022    Shortness of breath due to pregnancy in third trimester 2022         Past Surgical History:   Procedure Laterality Date    APPENDECTOMY       SECTION N/A 2011    TONSILLECTOMY              Current Outpatient Medications:     hydroCHLOROthiazide (HYDRODIURIL) 25 MG tablet, , Disp: , Rfl:     lisinopril (PRINIVIL;ZESTRIL) 20 MG tablet, , Disp: , Rfl:     ibuprofen (ADVIL;MOTRIN) 800 MG tablet, Take 1 tablet by mouth every 6-8 hours as needed for Pain, Disp: 90 tablet, Rfl: 0    furosemide (LASIX) 20 MG tablet, Take 1 tablet by mouth daily for 4 days, Disp: 4 tablet, Rfl: 0    Ferrous Sulfate (IRON PO), Take by mouth (Patient not taking: Reported on 10/5/2022), Disp: , Rfl: Prenatal Vit-Fe Fumarate-FA (PRENATAL PO), Take by mouth (Patient not taking: Reported on 10/5/2022), Disp: , Rfl:     acetaminophen (TYLENOL) 500 MG tablet, Take by mouth every 6 hours as needed (Patient not taking: Reported on 10/5/2022), Disp: , Rfl:             PE:  Visit Vitals  /74   Ht 5' 9\" (1.753 m)   BMI 33.97 kg/m²          Constitutional: She appears well-developed and well-nourished. No distress. HENT:    Head: Normocephalic and atraumatic. Cardiovascular: Regular pulse   Pulmonary/Chest: Effort normal  Skin: She is not diaphoretic. Psychiatric: She has a normal mood and affect. Her behavior is normal. Thought content normal. .         Contraception Counseling:  Discussed all forms of contraception available to her, including OCPs, patches, rings, Depo Provera, LARCs (Parguard vs Mirena vs Melanie, Nexplanons), and permanent sterilization. Discussed benefits and possible risks/SEs of these. Desires condoms until vasectomy.          Assessment/Plan:  21 y.o.  6wks pp:                 -meeting all pp goals              -cleared for intercourse              -RTO for annual GYN exams (3/2023)    -Contraception: Desires condoms until vasectomy        Yumi Santana MD

## 2025-03-20 ENCOUNTER — APPOINTMENT (OUTPATIENT)
Dept: URBAN - METROPOLITAN AREA CLINIC 329 | Facility: CLINIC | Age: 35
Setting detail: DERMATOLOGY
End: 2025-03-20

## 2025-03-20 DIAGNOSIS — D18.0 HEMANGIOMA: ICD-10-CM

## 2025-03-20 DIAGNOSIS — Z12.83 ENCOUNTER FOR SCREENING FOR MALIGNANT NEOPLASM OF SKIN: ICD-10-CM

## 2025-03-20 DIAGNOSIS — L82.1 OTHER SEBORRHEIC KERATOSIS: ICD-10-CM

## 2025-03-20 DIAGNOSIS — L81.4 OTHER MELANIN HYPERPIGMENTATION: ICD-10-CM

## 2025-03-20 DIAGNOSIS — Z71.89 OTHER SPECIFIED COUNSELING: ICD-10-CM

## 2025-03-20 DIAGNOSIS — D22 MELANOCYTIC NEVI: ICD-10-CM

## 2025-03-20 DIAGNOSIS — L57.8 OTHER SKIN CHANGES DUE TO CHRONIC EXPOSURE TO NONIONIZING RADIATION: ICD-10-CM

## 2025-03-20 DIAGNOSIS — D485 NEOPLASM OF UNCERTAIN BEHAVIOR OF SKIN: ICD-10-CM

## 2025-03-20 PROBLEM — D22.61 MELANOCYTIC NEVI OF RIGHT UPPER LIMB, INCLUDING SHOULDER: Status: ACTIVE | Noted: 2025-03-20

## 2025-03-20 PROBLEM — D23.5 OTHER BENIGN NEOPLASM OF SKIN OF TRUNK: Status: ACTIVE | Noted: 2025-03-20

## 2025-03-20 PROBLEM — D22.71 MELANOCYTIC NEVI OF RIGHT LOWER LIMB, INCLUDING HIP: Status: ACTIVE | Noted: 2025-03-20

## 2025-03-20 PROBLEM — D18.01 HEMANGIOMA OF SKIN AND SUBCUTANEOUS TISSUE: Status: ACTIVE | Noted: 2025-03-20

## 2025-03-20 PROBLEM — D22.5 MELANOCYTIC NEVI OF TRUNK: Status: ACTIVE | Noted: 2025-03-20

## 2025-03-20 PROBLEM — D22.72 MELANOCYTIC NEVI OF LEFT LOWER LIMB, INCLUDING HIP: Status: ACTIVE | Noted: 2025-03-20

## 2025-03-20 PROBLEM — D48.5 NEOPLASM OF UNCERTAIN BEHAVIOR OF SKIN: Status: ACTIVE | Noted: 2025-03-20

## 2025-03-20 PROCEDURE — 99203 OFFICE O/P NEW LOW 30 MIN: CPT | Mod: 25

## 2025-03-20 PROCEDURE — ? SUNSCREEN RECOMMENDATIONS

## 2025-03-20 PROCEDURE — ? BIOPSY BY SHAVE METHOD

## 2025-03-20 PROCEDURE — ? COUNSELING

## 2025-03-20 PROCEDURE — ? FULL BODY SKIN EXAM

## 2025-03-20 PROCEDURE — ? TREATMENT REGIMEN

## 2025-03-20 PROCEDURE — 11302 SHAVE SKIN LESION 1.1-2.0 CM: CPT

## 2025-03-20 PROCEDURE — 11102 TANGNTL BX SKIN SINGLE LES: CPT | Mod: 59

## 2025-03-20 PROCEDURE — ? SHAVE REMOVAL

## 2025-03-20 ASSESSMENT — LOCATION DETAILED DESCRIPTION DERM
LOCATION DETAILED: RIGHT CLAVICULAR SKIN
LOCATION DETAILED: RIGHT PROXIMAL DORSAL FOREARM
LOCATION DETAILED: RIGHT MEDIAL ACHILLES SKIN
LOCATION DETAILED: LEFT DISTAL CALF
LOCATION DETAILED: RIGHT ANTERIOR DISTAL THIGH
LOCATION DETAILED: EPIGASTRIC SKIN
LOCATION DETAILED: RIGHT VENTRAL DISTAL FOREARM
LOCATION DETAILED: LEFT CENTRAL MALAR CHEEK
LOCATION DETAILED: MID-FRONTAL SCALP
LOCATION DETAILED: LEFT DISTAL POSTERIOR THIGH
LOCATION DETAILED: RIGHT SUPERIOR MEDIAL UPPER BACK
LOCATION DETAILED: LEFT ANTERIOR DISTAL UPPER ARM
LOCATION DETAILED: UPPER STERNUM
LOCATION DETAILED: RIGHT ANTERIOR DISTAL UPPER ARM
LOCATION DETAILED: LEFT INFERIOR UPPER BACK
LOCATION DETAILED: SUPERIOR THORACIC SPINE
LOCATION DETAILED: LEFT PROXIMAL POSTERIOR THIGH

## 2025-03-20 ASSESSMENT — LOCATION SIMPLE DESCRIPTION DERM
LOCATION SIMPLE: LEFT CHEEK
LOCATION SIMPLE: RIGHT UPPER ARM
LOCATION SIMPLE: ABDOMEN
LOCATION SIMPLE: RIGHT FOOT
LOCATION SIMPLE: CHEST
LOCATION SIMPLE: RIGHT CLAVICULAR SKIN
LOCATION SIMPLE: RIGHT UPPER BACK
LOCATION SIMPLE: ANTERIOR SCALP
LOCATION SIMPLE: RIGHT THIGH
LOCATION SIMPLE: UPPER BACK
LOCATION SIMPLE: LEFT UPPER ARM
LOCATION SIMPLE: RIGHT FOREARM
LOCATION SIMPLE: LEFT UPPER BACK
LOCATION SIMPLE: LEFT POSTERIOR THIGH
LOCATION SIMPLE: LEFT CALF

## 2025-03-20 ASSESSMENT — LOCATION ZONE DERM
LOCATION ZONE: LEG
LOCATION ZONE: FACE
LOCATION ZONE: FEET
LOCATION ZONE: TRUNK
LOCATION ZONE: SCALP
LOCATION ZONE: ARM

## 2025-03-20 NOTE — HPI: SKIN LESION
What Type Of Note Output Would You Prefer (Optional)?: Bullet Format
How Severe Is Your Skin Lesion?: mild
Is This A New Presentation, Or A Follow-Up?: Skin Lesions
Additional History: Patient states that she has a lesion on her right shoulder. She states that it has been there for years but it has recently changed and gotten darker. She describes the lesion as irritated.

## 2025-05-21 ENCOUNTER — TRANSCRIBE ORDERS (OUTPATIENT)
Dept: SCHEDULING | Age: 35
End: 2025-05-21
